# Patient Record
Sex: MALE | Employment: UNEMPLOYED | URBAN - METROPOLITAN AREA
[De-identification: names, ages, dates, MRNs, and addresses within clinical notes are randomized per-mention and may not be internally consistent; named-entity substitution may affect disease eponyms.]

---

## 2020-03-22 ENCOUNTER — HOSPITAL ENCOUNTER (INPATIENT)
Facility: CLINIC | Age: 35
LOS: 1 days | Discharge: LEFT BEFORE DISCHARGE | DRG: 885 | End: 2020-03-24
Attending: PSYCHIATRY & NEUROLOGY | Admitting: PSYCHIATRY & NEUROLOGY

## 2020-03-22 DIAGNOSIS — F22 PARANOIA (H): ICD-10-CM

## 2020-03-22 DIAGNOSIS — F29 PSYCHOSIS, UNSPECIFIED PSYCHOSIS TYPE (H): ICD-10-CM

## 2020-03-22 LAB
AMPHETAMINES UR QL SCN: NEGATIVE
BARBITURATES UR QL: NEGATIVE
BENZODIAZ UR QL: NEGATIVE
CANNABINOIDS UR QL SCN: NEGATIVE
COCAINE UR QL: NEGATIVE
ETHANOL UR QL SCN: NEGATIVE
OPIATES UR QL SCN: NEGATIVE

## 2020-03-22 PROCEDURE — 99285 EMERGENCY DEPT VISIT HI MDM: CPT | Mod: 25 | Performed by: PSYCHIATRY & NEUROLOGY

## 2020-03-22 PROCEDURE — 99285 EMERGENCY DEPT VISIT HI MDM: CPT | Mod: Z6 | Performed by: PSYCHIATRY & NEUROLOGY

## 2020-03-22 PROCEDURE — 80307 DRUG TEST PRSMV CHEM ANLYZR: CPT | Performed by: PSYCHIATRY & NEUROLOGY

## 2020-03-22 PROCEDURE — 90791 PSYCH DIAGNOSTIC EVALUATION: CPT

## 2020-03-22 PROCEDURE — 80320 DRUG SCREEN QUANTALCOHOLS: CPT | Performed by: PSYCHIATRY & NEUROLOGY

## 2020-03-22 ASSESSMENT — ENCOUNTER SYMPTOMS
ABDOMINAL PAIN: 0
FEVER: 0
HALLUCINATIONS: 1
SHORTNESS OF BREATH: 0
NERVOUS/ANXIOUS: 1
SLEEP DISTURBANCE: 1
DYSPHORIC MOOD: 0

## 2020-03-23 PROBLEM — F22 PARANOIA (H): Status: ACTIVE | Noted: 2020-03-23

## 2020-03-23 LAB
ALBUMIN SERPL-MCNC: 4.1 G/DL (ref 3.4–5)
ALP SERPL-CCNC: 55 U/L (ref 40–150)
ALT SERPL W P-5'-P-CCNC: 46 U/L (ref 0–70)
ANION GAP SERPL CALCULATED.3IONS-SCNC: 6 MMOL/L (ref 3–14)
AST SERPL W P-5'-P-CCNC: 36 U/L (ref 0–45)
BASOPHILS # BLD AUTO: 0 10E9/L (ref 0–0.2)
BASOPHILS NFR BLD AUTO: 0.2 %
BILIRUB SERPL-MCNC: 0.6 MG/DL (ref 0.2–1.3)
BUN SERPL-MCNC: 12 MG/DL (ref 7–30)
CALCIUM SERPL-MCNC: 8.9 MG/DL (ref 8.5–10.1)
CHLORIDE SERPL-SCNC: 105 MMOL/L (ref 94–109)
CHOLEST SERPL-MCNC: 214 MG/DL
CO2 SERPL-SCNC: 27 MMOL/L (ref 20–32)
CREAT SERPL-MCNC: 1.02 MG/DL (ref 0.66–1.25)
DEPRECATED CALCIDIOL+CALCIFEROL SERPL-MC: 16 UG/L (ref 20–75)
DIFFERENTIAL METHOD BLD: NORMAL
EOSINOPHIL # BLD AUTO: 0 10E9/L (ref 0–0.7)
EOSINOPHIL NFR BLD AUTO: 0.9 %
ERYTHROCYTE [DISTWIDTH] IN BLOOD BY AUTOMATED COUNT: 12.6 % (ref 10–15)
GFR SERPL CREATININE-BSD FRML MDRD: >90 ML/MIN/{1.73_M2}
GLUCOSE SERPL-MCNC: 94 MG/DL (ref 70–99)
HCT VFR BLD AUTO: 43.6 % (ref 40–53)
HDLC SERPL-MCNC: 46 MG/DL
HGB BLD-MCNC: 15.3 G/DL (ref 13.3–17.7)
IMM GRANULOCYTES # BLD: 0 10E9/L (ref 0–0.4)
IMM GRANULOCYTES NFR BLD: 0.2 %
LDLC SERPL CALC-MCNC: 148 MG/DL
LYMPHOCYTES # BLD AUTO: 2.4 10E9/L (ref 0.8–5.3)
LYMPHOCYTES NFR BLD AUTO: 51 %
MCH RBC QN AUTO: 30 PG (ref 26.5–33)
MCHC RBC AUTO-ENTMCNC: 35.1 G/DL (ref 31.5–36.5)
MCV RBC AUTO: 86 FL (ref 78–100)
MONOCYTES # BLD AUTO: 0.6 10E9/L (ref 0–1.3)
MONOCYTES NFR BLD AUTO: 12.5 %
NEUTROPHILS # BLD AUTO: 1.6 10E9/L (ref 1.6–8.3)
NEUTROPHILS NFR BLD AUTO: 35.2 %
NONHDLC SERPL-MCNC: 168 MG/DL
NRBC # BLD AUTO: 0 10*3/UL
NRBC BLD AUTO-RTO: 0 /100
PLATELET # BLD AUTO: 206 10E9/L (ref 150–450)
POTASSIUM SERPL-SCNC: 3.9 MMOL/L (ref 3.4–5.3)
PROT SERPL-MCNC: 7.9 G/DL (ref 6.8–8.8)
RBC # BLD AUTO: 5.1 10E12/L (ref 4.4–5.9)
SODIUM SERPL-SCNC: 138 MMOL/L (ref 133–144)
T4 FREE SERPL-MCNC: 1.19 NG/DL (ref 0.76–1.46)
TRIGL SERPL-MCNC: 99 MG/DL
TSH SERPL DL<=0.005 MIU/L-ACNC: 5.31 MU/L (ref 0.4–4)
WBC # BLD AUTO: 4.6 10E9/L (ref 4–11)

## 2020-03-23 PROCEDURE — 80053 COMPREHEN METABOLIC PANEL: CPT | Performed by: PSYCHIATRY & NEUROLOGY

## 2020-03-23 PROCEDURE — 85025 COMPLETE CBC W/AUTO DIFF WBC: CPT | Performed by: PSYCHIATRY & NEUROLOGY

## 2020-03-23 PROCEDURE — 84439 ASSAY OF FREE THYROXINE: CPT | Performed by: PSYCHIATRY & NEUROLOGY

## 2020-03-23 PROCEDURE — 80061 LIPID PANEL: CPT | Performed by: PSYCHIATRY & NEUROLOGY

## 2020-03-23 PROCEDURE — 36415 COLL VENOUS BLD VENIPUNCTURE: CPT | Performed by: PSYCHIATRY & NEUROLOGY

## 2020-03-23 PROCEDURE — 12400001 ZZH R&B MH UMMC

## 2020-03-23 PROCEDURE — 99223 1ST HOSP IP/OBS HIGH 75: CPT | Mod: AI | Performed by: NURSE PRACTITIONER

## 2020-03-23 PROCEDURE — 84443 ASSAY THYROID STIM HORMONE: CPT | Performed by: PSYCHIATRY & NEUROLOGY

## 2020-03-23 PROCEDURE — 82306 VITAMIN D 25 HYDROXY: CPT | Performed by: PSYCHIATRY & NEUROLOGY

## 2020-03-23 RX ORDER — BISACODYL 10 MG
10 SUPPOSITORY, RECTAL RECTAL DAILY PRN
Status: DISCONTINUED | OUTPATIENT
Start: 2020-03-23 | End: 2020-03-24 | Stop reason: HOSPADM

## 2020-03-23 RX ORDER — TRAZODONE HYDROCHLORIDE 50 MG/1
50 TABLET, FILM COATED ORAL
Status: DISCONTINUED | OUTPATIENT
Start: 2020-03-23 | End: 2020-03-24 | Stop reason: HOSPADM

## 2020-03-23 RX ORDER — OLANZAPINE 10 MG/1
10 TABLET ORAL AT BEDTIME
Status: DISCONTINUED | OUTPATIENT
Start: 2020-03-23 | End: 2020-03-24 | Stop reason: HOSPADM

## 2020-03-23 RX ORDER — ALUMINA, MAGNESIA, AND SIMETHICONE 2400; 2400; 240 MG/30ML; MG/30ML; MG/30ML
30 SUSPENSION ORAL EVERY 4 HOURS PRN
Status: DISCONTINUED | OUTPATIENT
Start: 2020-03-23 | End: 2020-03-24 | Stop reason: HOSPADM

## 2020-03-23 RX ORDER — OLANZAPINE 10 MG/1
10 TABLET ORAL
Status: DISCONTINUED | OUTPATIENT
Start: 2020-03-23 | End: 2020-03-24 | Stop reason: HOSPADM

## 2020-03-23 RX ORDER — HYDROXYZINE HYDROCHLORIDE 25 MG/1
25 TABLET, FILM COATED ORAL EVERY 4 HOURS PRN
Status: DISCONTINUED | OUTPATIENT
Start: 2020-03-23 | End: 2020-03-24 | Stop reason: HOSPADM

## 2020-03-23 RX ORDER — OLANZAPINE 10 MG/2ML
10 INJECTION, POWDER, FOR SOLUTION INTRAMUSCULAR
Status: DISCONTINUED | OUTPATIENT
Start: 2020-03-23 | End: 2020-03-24 | Stop reason: HOSPADM

## 2020-03-23 RX ORDER — ACETAMINOPHEN 325 MG/1
650 TABLET ORAL EVERY 4 HOURS PRN
Status: DISCONTINUED | OUTPATIENT
Start: 2020-03-23 | End: 2020-03-24 | Stop reason: HOSPADM

## 2020-03-23 ASSESSMENT — ACTIVITIES OF DAILY LIVING (ADL)
BATHING: 0-->INDEPENDENT
LAUNDRY: WITH SUPERVISION
HYGIENE/GROOMING: INDEPENDENT
LAUNDRY: WITH SUPERVISION
RETIRED_COMMUNICATION: 0-->UNDERSTANDS/COMMUNICATES WITHOUT DIFFICULTY
SWALLOWING: 0-->SWALLOWS FOODS/LIQUIDS WITHOUT DIFFICULTY
RETIRED_EATING: 0-->INDEPENDENT
TOILETING: 0-->INDEPENDENT
COGNITION: 1 - ATTENTION OR MEMORY DEFICITS
HYGIENE/GROOMING: INDEPENDENT
ORAL_HYGIENE: INDEPENDENT
TRANSFERRING: 0-->INDEPENDENT
DRESS: 0-->INDEPENDENT
FALL_HISTORY_WITHIN_LAST_SIX_MONTHS: NO
DRESS: SCRUBS (BEHAVIORAL HEALTH)
DRESS: SCRUBS (BEHAVIORAL HEALTH)
AMBULATION: 0-->INDEPENDENT
ORAL_HYGIENE: INDEPENDENT

## 2020-03-23 ASSESSMENT — MIFFLIN-ST. JEOR: SCORE: 1598.07

## 2020-03-23 NOTE — ED NOTES
Bed: HW01  Expected date:   Expected time:   Means of arrival:   Comments:  Zain [address] 34 y/o dizziness/ paranoia
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
## 2020-03-23 NOTE — PROGRESS NOTES
"Admission Note:    Pt is a 34 year old voluntary patient who came into the [facility] ED fearful of his delusions. Pt has one previous ED visit in November, 2019 for paranoia.Pt not currently taking any medications, per pt and stated \"I do not want medication.\" Pt cooperative and polite during search. Pt appeared guarded and suspicious during search and nursing intake interview. Wristband verified with pt with name and date of birth, orientation to unit, with no further questions from individual. Pt entered room and requested lights stay on. He laid down and placed blanket over his head.   "

## 2020-03-23 NOTE — PROGRESS NOTES
PATIENT BELONGINGS:    Items in Patient Locker:  -shoes with laces, cap, smart phone, jacket, pants with drawstrings, shirt, wallet with [place] ID    Items sent to Security:  -RBCU debit (Visa, 1055)  -RBCU debit (Visa, 4300)  -RBCU Instant Cash (2933)  -[facility] debit (Visa, 3031)  -Cash ($8.00)    ---No Cash, No credit/debit cards, and No Medications (other than noted above) at the time of the admission.    A               Admission:  I am responsible for any personal items that are not sent to the safe or pharmacy.  [facility] is not responsible for loss, theft or damage of any property in my possession.    Signature:  _________________________________ Date: _______  Time: _____                                              Staff Signature:  ____________________________ Date: ________  Time: _____      2nd Staff person, if patient is unable/unwilling to sign:    Signature: ________________________________ Date: ________  Time: _____     Discharge:  [facility] has returned all of my personal belongings:    Signature: _________________________________ Date: ________  Time: _____                                          Staff Signature:  ____________________________ Date: ________  Time: _____

## 2020-03-23 NOTE — PROGRESS NOTES
03/23/20 1435   Behavioral Health   Hallucinations denies / not responding to hallucinations   Thinking intact;poor concentration   Orientation person: oriented;place: oriented;date: oriented;time: oriented   Memory baseline memory   Insight poor   Judgement impaired   Eye Contact at examiner   Affect irritable   Mood mood is calm;irritable   Physical Appearance/Attire neat;appears stated age;attire appropriate to age and situation   Hygiene well groomed   Suicidality   (Denies`)   1. Wish to be Dead (Recent) No   2. Non-Specific Active Suicidal Thoughts (Recent) No   Duration (Lifetime) NA   Self Injury   (Denies)   Elopement   (None stated or observed)   Activity isolative;withdrawn   Speech coherent;clear   Medication Sensitivity no observed side effects;no stated side effects   Psychomotor / Gait steady;balanced   Activities of Daily Living   Hygiene/Grooming independent   Oral Hygiene independent   Dress scrubs (behavioral health)   Laundry with supervision   Room Organization independent     Pt had an isolative shift. Pt denies SI, SIB, depression, anxiety, hallucinations and side effects from medications. Pt was spent most of the shift in his room sleeping. Pt appeared to be irritable throughout the shift. Pt doesn't wish to be dead and feels safe in the unit.

## 2020-03-23 NOTE — PROGRESS NOTES
INITIAL PSYCHOSOCIAL ASSESSMENT AND NOTE  I have reviewed the chart met with the patient, and developed Care Plan.  Information for assessment was obtained from: pt and chart  PRESENTING PROBLEM: pt was admitted to station 32 on a voluntary basis secondary to psychosis. Pt believes people are a danger to him, chasing him, watching him. Pt moves between friends every few months due to his paranoia; he begins believing that they are poisoning his food.   The following areas have been assessed:  History of Mental Health and Chemical Dependency: This is pt's second inpatient mh admit. First was at [facility] from 10/31/2019 to 2019; 1 day.   Pt reports drinks about 2-3 beers per week. No drugs. No hx of CD treatment.   Living Situation: pt is homeless, usually stays with friends  Significant Life Events (Illness, Abuse, Trauma, Death): Mom  in ; dad  in   Family Description (Constellation, Family Psychiatric History): Never , one daughter with a girlfriend who still resides in [place]. Pt reports emigrated to the  about 10 years ago. Pt has 6 brothers, 2 sisters; all live in [place]. Both parents are .   No known family hx of mh or cd issues.   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 Medications will be reviewed and adjusted per MD as indicated. The treatment team will continue to assess and stabilize the  patient's mental health symptoms with the use of medications and therapeutic programming. Hospital staff will provide a safe environment and a therapeutic milieu. Staff will continue to assess patient as needed. Patient will be encouraged to participate in unit groups and activities. Patient will receive individual and group support on the unit.  CTC will do individual inpatient treatment planning and after care planning. CTC will discuss options for increasing community supports with the patient. CTC will coordinate with outpatient providers and will place referrals to ensure appropriate follow up care is in place.

## 2020-03-23 NOTE — ED NOTES
ED to Behavioral Floor Handoff    SITUATION  Ki Hdez is a 34 year old male who speaks English and lives in a home with others The patient arrived in the ED by ambulance from home with a complaint of Paranoid (outside feels stared at, people are out to get him) and Dizziness (says house makes him dizzy, feels fine outside of house)  .The patient's current symptoms started/worsened 1 day(s) ago and during this time the symptoms have increased.   In the ED, pt was diagnosed with   Final diagnoses:   Psychosis, unspecified psychosis type (H)   Paranoia (H)        Initial vitals were: BP: (!) 144/106  Pulse: 66  Temp: 97.8  F (36.6  C)  SpO2: 100 %   --------  Is the patient diabetic? No   If yes, last blood glucose? --     If yes, was this treated in the ED? --  --------  Is the patient inebriated (ETOH) No or Impaired on other substances? No  MSSA done? N/A  Last MSSA score: --    Were withdrawal symptoms treated? N/A  Does the patient have a seizure history? No. If yes, date of most recent seizure--  --------  Is the patient patient experiencing suicidal ideation? reports occasional suicidal thoughts representing feeling that life is not worth feeling    Homicidal ideation? denies current or recent homicidal ideation or behaviors.    Self-injurious behavior/urges? denies current or recent self injurious behavior or ideation.  ------  Was pt aggressive in the ED No  Was a code called No  Is the pt now cooperative? Yes  -------  Meds given in ED: Medications - No data to display   Family present during ED course? No  Family currently present? No    BACKGROUND  Does the patient have a cognitive impairment or developmental disability? No  Allergies: No Known Allergies.   Social demographics are   Social History     Socioeconomic History     Marital status: Single     Spouse name: None     Number of children: None     Years of education: None     Highest education level: None   Occupational History     None   Social  Needs     Financial resource strain: None     Food insecurity     Worry: None     Inability: None     Transportation needs     Medical: None     Non-medical: None   Tobacco Use     Smoking status: None   Substance and Sexual Activity     Alcohol use: None     Drug use: None     Sexual activity: None   Lifestyle     Physical activity     Days per week: None     Minutes per session: None     Stress: None   Relationships     Social connections     Talks on phone: None     Gets together: None     Attends [employer] service: None     Active member of club or organization: None     Attends meetings of clubs or organizations: None     Relationship status: None     Intimate partner violence     Fear of current or ex partner: None     Emotionally abused: None     Physically abused: None     Forced sexual activity: None   Other Topics Concern     None   Social History Narrative     None        ASSESSMENT  Labs results   Labs Ordered and Resulted from Time of ED Arrival Up to the Time of Departure from the ED   DRUG ABUSE SCREEN 6 CHEM DEP URINE ([facility])      Imaging Studies: No results found for this or any previous visit (from the past 24 hour(s)).   Most recent vital signs BP (!) 141/97   Pulse 66   Temp 97.8  F (36.6  C) (Oral)   SpO2 100%    Abnormal labs/tests/findings requiring intervention:---   Pain control: pt had none  Nausea control: pt had none    RECOMMENDATION  Are any infection precautions needed (MRSA, VRE, etc.)? No If yes, what infection? --  ---  Does the patient have mobility issues? independently. If yes, what device does the pt use? ---  ---  Is patient on 72 hour hold or commitment? No If on 72 hour hold, have hold and rights been given to patient? N/A  Are admitting orders written if after 10 p.m. ?N/A  Tasks needing to be completed:---     Lisa Smiley RN   ascom-- [id number]   [phone] [facility]   [phone] [facility] ED

## 2020-03-23 NOTE — PROGRESS NOTES
"Pt lying in bed with light on and head under blanket. Offered zyprexa with explanation of medication and pt refused. Pt stated \"No medications\".  "
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
## 2020-03-23 NOTE — PLAN OF CARE
BEHAVIORAL TEAM DISCUSSION    Participants: Provider: Virginia Han NP  CTC: Snow Mariscal MS,LP   Nurse: Sulema Crenshaw, BRODY   Psych Associate: Klaus Ortiz    Progress: Pt was admitted to station 32 on a voluntary basis secondary to psychosis. Pt has ongoing paranoia that is interfering with his functioning.   Anticipated length of stay: 1-2 weeks  Continued Stay Criteria/Rationale: pt to remain inpatient for safety and stabilization  Medical/Physical: none noted  Precautions:   Behavioral Orders   Procedures     Code 1 - Restrict to Unit     Routine Programming     As clinically indicated     Single Room     Status 15     Every 15 minutes.     Plan: The patient will continue to meet w/ the treatment team for assessment and treatment planning, CTC will continue to work w/ for discharge planning.    Rationale for change in precautions or plan: pt to remain inpatient for stabilization

## 2020-03-23 NOTE — PROGRESS NOTES
SPIRITUAL HEALTH SERVICES  SPIRITUAL ASSESSMENT Progress Note  [facility] ([facility]) 32N     REFERRAL SOURCE: Pt request for hospital  at admission     Called unit to assess pt's needs, due to both 'hospital ' and 'no thank you' being selected during initial intake. In consultation with staff, determined best to cancel request at this time and new order will be placed if/when appropriate.    PLAN: Spiritual health remains available.    Amna Vivar  Chaplain Resident  Pager [phone]    [facility] remains available 24/7 for emergent requests/referrals, either by having the switchboard page the on-call  or by entering an ASAP/STAT consult in Epic (this will also page the on-call ).

## 2020-03-24 VITALS
HEIGHT: 63 IN | OXYGEN SATURATION: 100 % | DIASTOLIC BLOOD PRESSURE: 101 MMHG | TEMPERATURE: 97.9 F | BODY MASS INDEX: 30.03 KG/M2 | SYSTOLIC BLOOD PRESSURE: 146 MMHG | WEIGHT: 169.5 LBS | RESPIRATION RATE: 16 BRPM | HEART RATE: 67 BPM

## 2020-03-24 PROCEDURE — 99239 HOSP IP/OBS DSCHRG MGMT >30: CPT | Performed by: NURSE PRACTITIONER

## 2020-03-24 RX ORDER — OLANZAPINE 10 MG/1
10 TABLET ORAL AT BEDTIME
Qty: 30 TABLET | Refills: 1 | Status: SHIPPED | OUTPATIENT
Start: 2020-03-24 | End: 2021-11-04

## 2020-03-24 ASSESSMENT — ACTIVITIES OF DAILY LIVING (ADL)
ORAL_HYGIENE: INDEPENDENT
LAUNDRY: WITH SUPERVISION
DRESS: SCRUBS (BEHAVIORAL HEALTH)
HYGIENE/GROOMING: INDEPENDENT

## 2020-03-24 ASSESSMENT — MIFFLIN-ST. JEOR: SCORE: 1598.98
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
## 2020-03-24 NOTE — PROGRESS NOTES
"Pt in lounge, repeatedly looking around strong and lounge. Offered emotional support and discussed with pt safety and his room. Pt appears suspicious and stated he did not like staff in his room. He stated \"I don't know what they could have been doing, they could have done anything in my room.\" Pt stated he did not like how people are walking past his room during shift change. Pt reassured staff are here for safety and room within view of nursing station. Pt insisted his door stay closed until he decides to go to his room. After several minutes, pt went to his room and laid down.  "

## 2020-03-24 NOTE — DISCHARGE INSTRUCTIONS
Behavioral Discharge Planning and Instructions      Summary:    You were admitted on 3/22/2020  due to Psychotic Symptomology.  You were treated by Virginia Han NP and discharged on 3/24/2020 from Station [facility] to your friend's home, per your report      Principal Diagnosis:     Unspecified Psychosis, likely schizophrenia      Health Care Follow-up Appointments:     You do not currently have active insurance. You have applied for insurance while here. While waiting for this to activate, you can go to the following clinics which offer free or sliding fee services:   [facility] (also has dental services)  [address] [place], [place] [address], [place] - [address]  [phone]    [facility] [place] [place] (also has dental services)  [address] [facility] [place] [facility] [address]  Phone: [phone]     [facility]  [address] [place] [address]  Hours: Monday, Wednesday, and Friday from 1-3pm; Monday through Thursday from 6:30pm-8:30pm   Phone: [phone]    No appointment is necessary; no paperwork; no fee.    [address] [place] [address] Triage Open Monday through Friday  Medical and Behavioral Health Triage is a new service at [address] [place] [address] that brings together community-based mental health agencies, [facility] , and [facility]) to address our clients' complex needs. The goal of this program is to reach people that are not already receiving services or that are not already connected to case management who have an urgent concern related to their mental health or substance use disorder.   Care coordinators, peer recovery specialists, and health professionals at [address] [place] [address] will address clients' physical health, mental health, addiction issues - and also the wrap-around services that they need to stay healthy, including housing, health insurance, and other resources.   Triage is located in [facility] at [address] [place] [address]. It is accessible by  "police from the parking lot. Everyone else can come through the front door of [address] [place] [address], and follow signs to N141.     Triage hours:10:00 am - 5:00 pm  Triage Phone Number: [phone]  Location: [address] [place] [address], room N141       If no appointments scheduled, explain pt is being discharged AMA and has no insurance  Attend all scheduled appointments with your outpatient providers. Call at least 24 hours in advance if you need to reschedule an appointment to ensure continued access to your outpatient providers.   Major Treatments, Procedures and Findings:  You were provided with: a psychiatric assessment, medication evaluation and/or management and milieu management    Symptoms to Report: increased confusion, losing more sleep, mood getting worse, thoughts of suicide or paranoid thoughts    Early warning signs can include: increased depression or anxiety sleep disturbances increased thoughts or behaviors of suicide or self-harm  increased unusual thinking, such as paranoia or hearing voices    Safety and Wellness:  Take all medicines as directed.  Make no changes unless your doctor suggests them.      Follow treatment recommendations.  Refrain from alcohol and non-prescribed drugs.  If there is a concern for safety, call 911.    Resources:   Crisis Intervention: 864.693.2746 or 985-778-0216 (TTY: 900.539.1945).  Call anytime for help.  National [place] [employer] Mental Illness (www.[place].d[place].org): 653.531.6426 or 036-640-2664.  Suicide Awareness Voices of Education (SAVE) (www.save.org): 883-511-SAVE (7243)  [facility] Crisis (COPE) Response - Adult 600 258-1330  Text 4 Life: txt \"LIFE\" to 13798 for immediate support and crisis intervention  Crisis text line: Text \"[place]\" to 931879. Free, confidential, 24/7.  Crisis Intervention: 528.222.8629 or 536-306-8569. Call anytime for help.       The treatment team has appreciated the opportunity to work with you.     If you have any questions or concerns our unit number is [phone]

## 2020-03-24 NOTE — PROGRESS NOTES
"Patient was seclusive to his room most of the evening.  He was upset when staff did environmental checks in his room and he was asked to step into the strong for a moment.  He made several attempts to return to the room and was asked to stay outside every time.  Writer intervened and reassured him that these are done twice daily on all patients to keep everyone safe and that nothing bad was going on or suspected.  Patient later refused HS zyprexa; stated \"I have to talk to my doctor before I take anything.\"  Affect has been wary and paranoid all evening.  At 2255 he burst out of his room and demanded to be taken to the ED immediately to see a doctor.  He would not answer when asked if he was in pain or where it hurt, or what he needed, changing request instead to immediate transfer to another hospital. This was all done very loudly with frequent imprecations to God.  Patient did not respond to requests that he lower his voice as others were sleeping. Patient eventually responded to reassurance and returned to bed.  "

## 2020-03-24 NOTE — PROGRESS NOTES
Pt up to counter stating he needs to go to the emergency room. Pt was asked if he could describe how he feels and pt walked away. Pt then stated he had some muscle pain in his back and offered medication. Pt refusing medication and requested vs to be taken. VS taken-see flowsheet. Pt sat in hallway, paced a few times up and down the hallway, and then returned to his room.

## 2020-03-24 NOTE — PROGRESS NOTES
Pt observed by this writer and PA pulling on locked door handle at the end of the strong. Pt appears suspicious, looking around the unit.

## 2020-03-24 NOTE — PROGRESS NOTES
"Pt appears to be responding to either AH/VH. Pt has been isolative. However, pt was visible in the milieu up until dinner. After dinner Pt stayed in his room until 11:20pm. Pt came to the desk and started pacing. Pt said \" I don't want to be here, I can't be here\"        03/23/20 2300   Behavioral Health   Hallucinations appears responding   Thinking poor concentration   Orientation person: oriented   Memory baseline memory   Insight poor   Judgement impaired   Eye Contact at floor   Affect full range affect   Mood mood is calm   Physical Appearance/Attire attire appropriate to age and situation   Hygiene well groomed   Suicidality other (see comments)  (NADIRA)   1. Wish to be Dead (Recent)   (NADIRA)   2. Non-Specific Active Suicidal Thoughts (Recent)   (NADIRA)   3. Active Sucidal Ideation with any Methods (Not Plan) Without Intent to Act (Recent)   (NADIRA)   4. Active Suicidal Ideation with Some Intent to Act, Without Specific Plan (Recent)   (NADIRA)   5. Active Suicidal Ideation with Specific Plan and Intent (Recent)   (NADIRA)   Self Injury   (None stated)   Elopement   (Yes pt said \" I don't want to be here\" )   Activity withdrawn;isolative   Speech coherent;clear   Medication Sensitivity no observed side effects;no stated side effects   Psychomotor / Gait balanced;steady   Psycho Education   Type of Intervention other (see comment)  (Observation)   Response observes from a distance   Hours 0.5   Treatment Detail Observation   Activities of Daily Living   Hygiene/Grooming independent   Oral Hygiene independent   Dress scrubs (behavioral health)   Laundry with supervision   Room Organization independent     "

## 2021-11-02 ENCOUNTER — APPOINTMENT (OUTPATIENT)
Dept: GENERAL RADIOLOGY | Facility: CLINIC | Age: 36
End: 2021-11-02
Attending: EMERGENCY MEDICINE
Payer: COMMERCIAL

## 2021-11-02 ENCOUNTER — HOSPITAL ENCOUNTER (OUTPATIENT)
Facility: CLINIC | Age: 36
Setting detail: OBSERVATION
Discharge: HOME | End: 2021-11-04
Attending: EMERGENCY MEDICINE | Admitting: EMERGENCY MEDICINE
Payer: COMMERCIAL

## 2021-11-02 DIAGNOSIS — F29 PSYCHOSIS, UNSPECIFIED PSYCHOSIS TYPE (H): ICD-10-CM

## 2021-11-02 DIAGNOSIS — R45.1 AGITATION: ICD-10-CM

## 2021-11-02 DIAGNOSIS — F22 PARANOIA (H): ICD-10-CM

## 2021-11-02 DIAGNOSIS — Z91.148 NONADHERENCE TO MEDICATION: ICD-10-CM

## 2021-11-02 DIAGNOSIS — F29 PSYCHOSIS, UNSPECIFIED PSYCHOSIS TYPE (H): Primary | ICD-10-CM

## 2021-11-02 LAB
AMPHETAMINES UR QL SCN: NORMAL
ANION GAP SERPL CALCULATED.3IONS-SCNC: 11 MMOL/L (ref 3–14)
APAP SERPL-MCNC: <2 MG/L (ref 10–30)
ATRIAL RATE - MUSE: 165 BPM
BARBITURATES UR QL: NORMAL
BASOPHILS # BLD AUTO: 0 10E3/UL (ref 0–0.2)
BASOPHILS NFR BLD AUTO: 0 %
BENZODIAZ UR QL: NORMAL
BUN SERPL-MCNC: 17 MG/DL (ref 7–30)
CALCIUM SERPL-MCNC: 8.7 MG/DL (ref 8.5–10.1)
CANNABINOIDS UR QL SCN: NORMAL
CHLORIDE BLD-SCNC: 104 MMOL/L (ref 94–109)
CO2 SERPL-SCNC: 22 MMOL/L (ref 20–32)
COCAINE UR QL: NORMAL
CREAT SERPL-MCNC: 1.14 MG/DL (ref 0.66–1.25)
DIASTOLIC BLOOD PRESSURE - MUSE: NORMAL MMHG
EOSINOPHIL # BLD AUTO: 0 10E3/UL (ref 0–0.7)
EOSINOPHIL NFR BLD AUTO: 0 %
ERYTHROCYTE [DISTWIDTH] IN BLOOD BY AUTOMATED COUNT: 13.8 % (ref 10–15)
ETHANOL SERPL-MCNC: <0.01 G/DL
GFR SERPL CREATININE-BSD FRML MDRD: 82 ML/MIN/1.73M2
GLUCOSE BLD-MCNC: 165 MG/DL (ref 70–99)
HCT VFR BLD AUTO: 43.6 % (ref 40–53)
HGB BLD-MCNC: 14.5 G/DL (ref 13.3–17.7)
HOLD SPECIMEN: NORMAL
IMM GRANULOCYTES # BLD: 0 10E3/UL
IMM GRANULOCYTES NFR BLD: 0 %
INTERPRETATION ECG - MUSE: NORMAL
LYMPHOCYTES # BLD AUTO: 5.2 10E3/UL (ref 0.8–5.3)
LYMPHOCYTES NFR BLD AUTO: 55 %
MCH RBC QN AUTO: 28.3 PG (ref 26.5–33)
MCHC RBC AUTO-ENTMCNC: 33.3 G/DL (ref 31.5–36.5)
MCV RBC AUTO: 85 FL (ref 78–100)
MONOCYTES # BLD AUTO: 0.7 10E3/UL (ref 0–1.3)
MONOCYTES NFR BLD AUTO: 7 %
NEUTROPHILS # BLD AUTO: 3.6 10E3/UL (ref 1.6–8.3)
NEUTROPHILS NFR BLD AUTO: 38 %
NRBC # BLD AUTO: 0 10E3/UL
NRBC BLD AUTO-RTO: 0 /100
OPIATES UR QL SCN: NORMAL
P AXIS - MUSE: 62 DEGREES
PCP UR QL SCN: NORMAL
PLAT MORPH BLD: ABNORMAL
PLATELET # BLD AUTO: 230 10E3/UL (ref 150–450)
POTASSIUM BLD-SCNC: 3.4 MMOL/L (ref 3.4–5.3)
PR INTERVAL - MUSE: 114 MS
QRS DURATION - MUSE: 74 MS
QT - MUSE: 238 MS
QTC - MUSE: 394 MS
R AXIS - MUSE: 13 DEGREES
RBC # BLD AUTO: 5.12 10E6/UL (ref 4.4–5.9)
RBC MORPH BLD: ABNORMAL
SALICYLATES SERPL-MCNC: <2 MG/DL
SARS-COV-2 RNA RESP QL NAA+PROBE: NEGATIVE
SMUDGE CELLS BLD QL SMEAR: PRESENT
SODIUM SERPL-SCNC: 137 MMOL/L (ref 133–144)
SYSTOLIC BLOOD PRESSURE - MUSE: NORMAL MMHG
T AXIS - MUSE: 38 DEGREES
VENTRICULAR RATE- MUSE: 165 BPM
WBC # BLD AUTO: 9.6 10E3/UL (ref 4–11)

## 2021-11-02 PROCEDURE — 90791 PSYCH DIAGNOSTIC EVALUATION: CPT

## 2021-11-02 PROCEDURE — 82077 ASSAY SPEC XCP UR&BREATH IA: CPT | Performed by: EMERGENCY MEDICINE

## 2021-11-02 PROCEDURE — 85025 COMPLETE CBC W/AUTO DIFF WBC: CPT | Performed by: EMERGENCY MEDICINE

## 2021-11-02 PROCEDURE — 250N000011 HC RX IP 250 OP 636

## 2021-11-02 PROCEDURE — 96374 THER/PROPH/DIAG INJ IV PUSH: CPT

## 2021-11-02 PROCEDURE — 73630 X-RAY EXAM OF FOOT: CPT | Mod: LT

## 2021-11-02 PROCEDURE — G0378 HOSPITAL OBSERVATION PER HR: HCPCS

## 2021-11-02 PROCEDURE — 250N000013 HC RX MED GY IP 250 OP 250 PS 637: Performed by: EMERGENCY MEDICINE

## 2021-11-02 PROCEDURE — 80179 DRUG ASSAY SALICYLATE: CPT | Performed by: EMERGENCY MEDICINE

## 2021-11-02 PROCEDURE — 80307 DRUG TEST PRSMV CHEM ANLYZR: CPT | Performed by: EMERGENCY MEDICINE

## 2021-11-02 PROCEDURE — 93005 ELECTROCARDIOGRAM TRACING: CPT

## 2021-11-02 PROCEDURE — 87635 SARS-COV-2 COVID-19 AMP PRB: CPT | Performed by: EMERGENCY MEDICINE

## 2021-11-02 PROCEDURE — 250N000013 HC RX MED GY IP 250 OP 250 PS 637: Performed by: NURSE PRACTITIONER

## 2021-11-02 PROCEDURE — C9803 HOPD COVID-19 SPEC COLLECT: HCPCS

## 2021-11-02 PROCEDURE — 96361 HYDRATE IV INFUSION ADD-ON: CPT

## 2021-11-02 PROCEDURE — 99220 PR INITIAL OBSERVATION CARE,LEVEL III: CPT | Performed by: NURSE PRACTITIONER

## 2021-11-02 PROCEDURE — 36415 COLL VENOUS BLD VENIPUNCTURE: CPT | Performed by: EMERGENCY MEDICINE

## 2021-11-02 PROCEDURE — 258N000003 HC RX IP 258 OP 636: Performed by: EMERGENCY MEDICINE

## 2021-11-02 PROCEDURE — 99285 EMERGENCY DEPT VISIT HI MDM: CPT | Mod: 25

## 2021-11-02 PROCEDURE — 80143 DRUG ASSAY ACETAMINOPHEN: CPT | Performed by: EMERGENCY MEDICINE

## 2021-11-02 PROCEDURE — 80048 BASIC METABOLIC PNL TOTAL CA: CPT | Performed by: EMERGENCY MEDICINE

## 2021-11-02 RX ORDER — PRAZOSIN HYDROCHLORIDE 1 MG/1
1 CAPSULE ORAL AT BEDTIME
Status: DISCONTINUED | OUTPATIENT
Start: 2021-11-02 | End: 2021-11-04 | Stop reason: HOSPADM

## 2021-11-02 RX ORDER — LORAZEPAM 2 MG/ML
2 INJECTION INTRAMUSCULAR ONCE
Status: COMPLETED | OUTPATIENT
Start: 2021-11-02 | End: 2021-11-02

## 2021-11-02 RX ORDER — LORAZEPAM 2 MG/ML
INJECTION INTRAMUSCULAR
Status: COMPLETED
Start: 2021-11-02 | End: 2021-11-02

## 2021-11-02 RX ORDER — OLANZAPINE 5 MG/1
5 TABLET ORAL 2 TIMES DAILY PRN
Status: DISCONTINUED | OUTPATIENT
Start: 2021-11-02 | End: 2021-11-04 | Stop reason: HOSPADM

## 2021-11-02 RX ORDER — LORAZEPAM 1 MG/1
1 TABLET ORAL EVERY 4 HOURS PRN
Status: DISCONTINUED | OUTPATIENT
Start: 2021-11-02 | End: 2021-11-04 | Stop reason: HOSPADM

## 2021-11-02 RX ORDER — OLANZAPINE 10 MG/1
10 TABLET ORAL AT BEDTIME
Status: DISCONTINUED | OUTPATIENT
Start: 2021-11-02 | End: 2021-11-04 | Stop reason: HOSPADM

## 2021-11-02 RX ORDER — OLANZAPINE 10 MG/1
10 TABLET, ORALLY DISINTEGRATING ORAL ONCE
Status: COMPLETED | OUTPATIENT
Start: 2021-11-02 | End: 2021-11-02

## 2021-11-02 RX ORDER — IBUPROFEN 600 MG/1
600 TABLET, FILM COATED ORAL EVERY 6 HOURS PRN
Status: DISCONTINUED | OUTPATIENT
Start: 2021-11-02 | End: 2021-11-04 | Stop reason: HOSPADM

## 2021-11-02 RX ADMIN — OLANZAPINE 10 MG: 10 TABLET, ORALLY DISINTEGRATING ORAL at 14:40

## 2021-11-02 RX ADMIN — OLANZAPINE 10 MG: 10 TABLET, FILM COATED ORAL at 21:56

## 2021-11-02 RX ADMIN — PRAZOSIN HYDROCHLORIDE 1 MG: 1 CAPSULE ORAL at 21:56

## 2021-11-02 RX ADMIN — SODIUM CHLORIDE 1000 ML: 9 INJECTION, SOLUTION INTRAVENOUS at 11:28

## 2021-11-02 RX ADMIN — LORAZEPAM 2 MG: 2 INJECTION INTRAMUSCULAR at 12:43

## 2021-11-02 RX ADMIN — LORAZEPAM 2 MG: 2 INJECTION INTRAMUSCULAR; INTRAVENOUS at 12:43

## 2021-11-02 RX ADMIN — IBUPROFEN 600 MG: 600 TABLET ORAL at 19:19

## 2021-11-02 RX ADMIN — SODIUM CHLORIDE 1000 ML: 9 INJECTION, SOLUTION INTRAVENOUS at 12:44

## 2021-11-02 ASSESSMENT — MIFFLIN-ST. JEOR
SCORE: 1692.5
SCORE: 1692.75

## 2021-11-02 NOTE — ED PROVIDER NOTES
History   Chief Complaint:  Altered mental status        The history is limited by the condition of the patient.   History supplemented by electronic chart review and EMS     Ki Hdez is a 35 year old male with history of paranoia, anxiety, insomnia and psychosis who presents with agitation. EMS reports that the patient called them after he began feeling anxious after not sleeping for 3 days.  She also reported to someone that he was having hallucinations.  When EMS arrived, the patient was walking out of a house to them and en route told paramedics that he was having hallucinations. He was given 2.5 g of droperidol and soon after became very agitated. The patient was then given 500 mg of Ketamine IM. He had a heart rate of 165 and blood sugar of 192. EMS also notes that he cut the bottom of his lip trying to get off of the stretcher and was placed in a spit carpio due to the blood.     Chart review shows occasional prior encounters for psychosis, most recently [facility] in late September at which time he was hospitalized for several days and ultimately discharged AMA.    Review of Systems   Unable to perform ROS: Mental status change     Allergies:  The patient has no known allergies.     Medications:  Zyprexa    Past Medical History:     Paranoia  Anxiety  Insomnia  Psychosis      Past Surgical History:    The patient denies past surgical history.     Family History:    The patient denies past family history.     Social History:  The patient presents via EMS.     Physical Exam     Patient Vitals for the past 24 hrs:   BP Temp Pulse Resp SpO2 Height Weight   11/02/21 1345 (!) 163/104 -- 111 28 97 % -- --   11/02/21 1330 (!) 160/101 -- 95 24 97 % -- --   11/02/21 1315 (!) 143/102 -- 104 23 96 % -- --   11/02/21 1300 (!) 135/117 -- 120 24 98 % -- --   11/02/21 1245 (!) 151/96 -- 112 28 96 % -- --   11/02/21 1230 -- -- 112 (!) 37 97 % -- --   11/02/21 1215 (!) 175/118 99.4  F (37.4  C) 113 (!) 40 98 % -- --  "  11/02/21 1200 (!) 176/105 -- (!) 138 (!) 42 98 % -- --   11/02/21 1145 (!) 161/107 -- (!) 134 (!) 39 96 % -- --   11/02/21 1131 (!) 174/109 -- (!) 149 24 100 % 1.676 m (5' 6\") 82 kg (180 lb 12.4 oz)     Physical Exam  General: Altered male recumbent in stabilization room 3, restrainted  HENT: mucous membranes moist, small abrasion and nonbleeding laceration to lower inner lip without significant swelling, no difficulty controlling secretions, no apparent major dental injury, no trismus  Eyes: PERRL without nystagmus  CV: extremities well perfused, regular rhythm  Resp: normal effort, speaks in full phrases, no stridor, no cough observed  GI: abdomen soft and nontender, no guarding  MSK: no bony tenderness   Skin: appropriately warm and dry  Neuro: Eyes open, intermittent thrashing in bed, moves all extremities with good tone, does not consistently follow commands   Psych: Agitated, nonverbal so unable to fully assess    Emergency Department Course   ECG  ECG obtained at 1132, ECG read at 1143  Sinus tachycardia  Nonspecific ST abnormality   Rate 165 bpm. NH interval 114 ms. QRS duration 74 ms. QT/QTc 238/394 ms. P-R-T axes 62 12 38.     Laboratory:  CBC: WBC 9.6, HGB 14.5,    BMP: Glucose 165 (H) o/w WNL (Creatinine 1.14)     Alcohol Level (Collected 1126): <0.01  Drug abuse screen 77 urine: Pending   Acetaminophen Level: <2  Salicylate Level: <2    Asymptomatic COVID19 Virus PCR by nasopharyngeal swab pending        Emergency Department Course:  Reviewed:  I reviewed nursing notes, vitals, past medical history and Care Everywhere    Assessments:  1114 The patient arrived with EMS and I received report.  1116 I obtained history and examined the patient as noted above. In person face to face assessment completed, including an evaluation of the patient's immediate reaction to the intervention, complete review of systems assessment, behavioral assessment and review/assessment of history, drugs and medications, " recent labs, etc., and behavioral condition. The patient experienced: No adverse physical outcome from seclusion/restraint initiation. The intervention of restraint or seclusion needs to continue.   1151 I rechecked the patient..   1214 I rechecked the patient. He currently has a pluse in the 110s.   1239 I rechecked the patient after the nurse noted that he was moving more.   1326 I checked on the patient again. He has a heart rate of 98 and a blood pressure in the 130s. I spoke with the charge nurse about moving the patient to another room.     Interventions:  1128 NS, 1 L, IV bolus   1243 Ativan 2 mg IV   1244 NS, 1 L, IV bolus   Zyprexa 10mg PO    Disposition:  Care of the patient was transferred to my colleague Dr. Birch pending mental health evaluation.     Impression & Plan   Medical Decision Making:  He presents with reports of hallucinations consistent with psychosis of unknown etiology.  Differential considered toxicologic conditions, psychiatric conditions, infection, and many others.  He required multiple doses of sedating medications, including a large dose of intramuscular ketamine from EMS, from which he recovered fairly promptly.  He demonstrates persistent psychosis and is placed on a hold.  I do not think that emergent neuroimaging is indicated, nor lumbar puncture.  His marked tachycardia and hypertension have improved since arrival.  He requires further assessment by the mental health service to help determine the most appropriate disposition.  Care signed out to my colleague on the afternoon shift for this purpose.    Diagnosis:    ICD-10-CM    1. Psychosis, unspecified psychosis type (H)  F29    2. Agitation  R45.1        Critical Care Time: Over 35 minutes, independent of procedures.  This included time spent obtaining a history and physical, reviewing the patient's chart, interpreting lab and imaging studies, re-examining the patient, coordinating care with other providers, and documenting  ED care provided.  He had agitated delirium and required aggressive resuscitation including chemical and physical restraints, with parenteral droperidol, ketamine, and lorazepam.  This condition carries high morbidity and mortality.  He has acute psychiatric decompensation.      Scribe Disclosure:  I, Shayne Verma, am serving as a scribe on 11/2/2021 at 11:21 AM to personally document services performed by Priyank Cazares MD based on my observations and the provider's statements to me.     This note was completed in part using Dragon voice recognition software. Although reviewed after completion, some word and grammatical errors may occur.         Priyank Cazares MD  11/02/21 1075

## 2021-11-02 NOTE — ED TRIAGE NOTES
Arrives via EMS from home.  He called 911 because he was having audio and visual hallucinations. Became combative in ambulance and was given 2.5mg droperidol followed by 500 mg ketamine IM. Arives to ED in restraints and with spithood on. Had a small cut to the lip which occurred prior to arrival.  
No

## 2021-11-02 NOTE — ED NOTES
Bed: ST03  Expected date:   Expected time:   Means of arrival:   Comments:  [facility] [place] - [facility] - 35 M eta 1110

## 2021-11-02 NOTE — ED NOTES
Shoes, pants cell phone in patient belongings bag and labeled. Shirt cut off by EMS prior to arrival

## 2021-11-02 NOTE — ED NOTES
"[facility] ED Mental Health Handoff Note:       Brief HPI:  This is a 35 year old male signed out to me by Dr. Cazares.  See initial ED Provider note for full details of the presentation.  Upon my examination the patient is complaining of left foot pain subsequently radiograph was undertaken and was unremarkable.    Safety concerns: At the time I received sign out, the patient had been aggressive/combative/agitated, but has calmed and the patient required medications for agitation and is now more calm.    Hold Status:  Active Orders   Legal    Health Officer Authority to Detain (MARII)     Frequency: Effective Now     Start Date/Time: 11/02/21 1127      Number of Occurrences: Until Specified     Order Comments: psychosis              Exam:   Patient Vitals for the past 24 hrs:   BP Temp Pulse Resp SpO2 Height Weight   11/02/21 1345 (!) 163/104 -- 111 28 97 % -- --   11/02/21 1330 (!) 160/101 -- 95 24 97 % -- --   11/02/21 1315 (!) 143/102 -- 104 23 96 % -- --   11/02/21 1300 (!) 135/117 -- 120 24 98 % -- --   11/02/21 1245 (!) 151/96 -- 112 28 96 % -- --   11/02/21 1230 -- -- 112 (!) 37 97 % -- --   11/02/21 1215 (!) 175/118 99.4  F (37.4  C) 113 (!) 40 98 % -- --   11/02/21 1200 (!) 176/105 -- (!) 138 (!) 42 98 % -- --   11/02/21 1145 (!) 161/107 -- (!) 134 (!) 39 96 % -- --   11/02/21 1131 (!) 174/109 -- (!) 149 24 100 % 1.676 m (5' 6\") 82 kg (180 lb 12.4 oz)     General: Calm redirectable  Cardiovascular: Well-perfused  Lungs: No respiratory distress  Musculoskeletal: Tenderness over the dorsum of the left foot    ED Course:    Medications   ibuprofen (ADVIL/MOTRIN) tablet 600 mg (has no administration in time range)   0.9% sodium chloride BOLUS (0 mLs Intravenous Stopped 11/2/21 1344)     Followed by   0.9% sodium chloride BOLUS (0 mLs Intravenous Stopped 11/2/21 1242)   LORazepam (ATIVAN) injection 2 mg (2 mg Intravenous Given 11/2/21 1243)   OLANZapine zydis (zyPREXA) ODT tab 10 mg (10 mg Oral Given " 11/2/21 1440)     Labs Ordered and Resulted from Time of ED Arrival to Time of ED Departure   ACETAMINOPHEN LEVEL - Abnormal       Result Value    Acetaminophen <2 (*)    BASIC METABOLIC PANEL - Abnormal    Sodium 137      Potassium 3.4      Chloride 104      Carbon Dioxide (CO2) 22      Anion Gap 11      Urea Nitrogen 17      Creatinine 1.14      Calcium 8.7      Glucose 165 (*)     GFR Estimate 82     RBC AND PLATELET MORPHOLOGY - Abnormal    Platelet Assessment        Value: Automated Count Confirmed. Platelet morphology is normal.    Smudge Cells Present (*)     RBC Morphology Confirmed RBC Indices     COVID-19 VIRUS (CORONAVIRUS) BY PCR - Normal    SARS CoV2 PCR Negative     ETHYL ALCOHOL LEVEL - Normal    Alcohol ethyl <0.01     SALICYLATE LEVEL - Normal    Salicylate <2     DRUG ABUSE SCREEN 77 URINE (FL, RH, SH) - Normal    Amphetamines Urine Screen Negative      Barbiturates Urine Screen Negative      Benzodiazepines Urine Screen Negative      Cannabinoids Urine Screen Negative      Cocaine Urine Screen Negative      Opiates Urine Screen Negative      PCP Urine Screen Negative     CBC WITH PLATELETS AND DIFFERENTIAL    WBC Count 9.6      RBC Count 5.12      Hemoglobin 14.5      Hematocrit 43.6      MCV 85      MCH 28.3      MCHC 33.3      RDW 13.8      Platelet Count 230      % Neutrophils 38      % Lymphocytes 55      % Monocytes 7      % Eosinophils 0      % Basophils 0      % Immature Granulocytes 0      NRBCs per 100 WBC 0      Absolute Neutrophils 3.6      Absolute Lymphocytes 5.2      Absolute Monocytes 0.7      Absolute Eosinophils 0.0      Absolute Basophils 0.0      Absolute Immature Granulocytes 0.0      Absolute NRBCs 0.0          XR Foot Left 3 Views   Final Result   IMPRESSION: Normal joint spaces and alignment. No fracture.                There were no significant events during my shift.    Patient was transferred to the empath unit.       Impression:    ICD-10-CM    1. Psychosis, unspecified  psychosis type (H)  F29    2. Agitation  R45.1        Plan:    Awaiting mental health evaluation/recommendations.      RESULTS:   Results for orders placed or performed during the hospital encounter of 11/02/21 (from the past 24 hour(s))   [place] Draw     Status: None    Collection Time: 11/02/21 11:26 AM    Narrative    The following orders were created for panel order [place] Draw.  Procedure                               Abnormality         Status                     ---------                               -----------         ------                     Extra Blue Top Tube[[id number]]                              Final result               Extra Red Top Tube[[id number]]                               Final result               Extra Green Top (Lithium...[[id number]]                      Final result               Extra Purple Top Tube[[id number]]                            Final result                 Please view results for these tests on the individual orders.   Extra Blue Top Tube     Status: None    Collection Time: 11/02/21 11:26 AM   Result Value Ref Range    Hold Specimen JIC    Extra Red Top Tube     Status: None    Collection Time: 11/02/21 11:26 AM   Result Value Ref Range    Hold Specimen JIC    Extra Green Top (Lithium Heparin) Tube     Status: None    Collection Time: 11/02/21 11:26 AM   Result Value Ref Range    Hold Specimen JIC    Extra Purple Top Tube     Status: None    Collection Time: 11/02/21 11:26 AM   Result Value Ref Range    Hold Specimen DONE    Acetaminophen level     Status: Abnormal    Collection Time: 11/02/21 11:26 AM   Result Value Ref Range    Acetaminophen <2 (L) 10 - 30 mg/L   Basic metabolic panel     Status: Abnormal    Collection Time: 11/02/21 11:26 AM   Result Value Ref Range    Sodium 137 133 - 144 mmol/L    Potassium 3.4 3.4 - 5.3 mmol/L    Chloride 104 94 - 109 mmol/L    Carbon Dioxide (CO2) 22 20 - 32 mmol/L    Anion Gap 11 3 - 14 mmol/L    Urea Nitrogen 17 7 - 30 mg/dL     Creatinine 1.14 0.66 - 1.25 mg/dL    Calcium 8.7 8.5 - 10.1 mg/dL    Glucose 165 (H) 70 - 99 mg/dL    GFR Estimate 82 >60 mL/min/1.73m2   CBC with platelets differential     Status: Abnormal    Collection Time: 11/02/21 11:26 AM    Narrative    The following orders were created for panel order CBC with platelets differential.  Procedure                               Abnormality         Status                     ---------                               -----------         ------                     CBC with platelets and d...[[id number]]                      Final result               RBC and Platelet Morphology[[id number]]  Abnormal            Final result                 Please view results for these tests on the individual orders.   Ethyl Alcohol Level     Status: Normal    Collection Time: 11/02/21 11:26 AM   Result Value Ref Range    Alcohol ethyl <0.01 <=0.01 g/dL   Salicylate level     Status: Normal    Collection Time: 11/02/21 11:26 AM   Result Value Ref Range    Salicylate <2 <20 mg/dL   CBC with platelets and differential     Status: None    Collection Time: 11/02/21 11:26 AM   Result Value Ref Range    WBC Count 9.6 4.0 - 11.0 10e3/uL    RBC Count 5.12 4.40 - 5.90 10e6/uL    Hemoglobin 14.5 13.3 - 17.7 g/dL    Hematocrit 43.6 40.0 - 53.0 %    MCV 85 78 - 100 fL    MCH 28.3 26.5 - 33.0 pg    MCHC 33.3 31.5 - 36.5 g/dL    RDW 13.8 10.0 - 15.0 %    Platelet Count 230 150 - 450 10e3/uL    % Neutrophils 38 %    % Lymphocytes 55 %    % Monocytes 7 %    % Eosinophils 0 %    % Basophils 0 %    % Immature Granulocytes 0 %    NRBCs per 100 WBC 0 <1 /100    Absolute Neutrophils 3.6 1.6 - 8.3 10e3/uL    Absolute Lymphocytes 5.2 0.8 - 5.3 10e3/uL    Absolute Monocytes 0.7 0.0 - 1.3 10e3/uL    Absolute Eosinophils 0.0 0.0 - 0.7 10e3/uL    Absolute Basophils 0.0 0.0 - 0.2 10e3/uL    Absolute Immature Granulocytes 0.0 <=0.0 10e3/uL    Absolute NRBCs 0.0 10e3/uL   RBC and Platelet Morphology     Status: Abnormal     Collection Time: 11/02/21 11:26 AM   Result Value Ref Range    Platelet Assessment  Automated Count Confirmed. Platelet morphology is normal.     Automated Count Confirmed. Platelet morphology is normal.    Smudge Cells Present (A) None Seen    RBC Morphology Confirmed RBC Indices    EKG 12-lead, tracing only     Status: None    Collection Time: 11/02/21 11:32 AM   Result Value Ref Range    Systolic Blood Pressure  mmHg    Diastolic Blood Pressure  mmHg    Ventricular Rate 165 BPM    Atrial Rate 165 BPM    WA Interval 114 ms    QRS Duration 74 ms     ms    QTc 394 ms    P Axis 62 degrees    R AXIS 13 degrees    T Axis 38 degrees    Interpretation ECG       Sinus tachycardia  Nonspecific ST abnormality  Abnormal ECG  No previous ECGs available  Confirmed by GENERATED REPORT, COMPUTER ([id number]),  NATHANIEL PRO ([id number]) on 11/2/2021 2:03:41 PM     Asymptomatic COVID-19 Virus (Coronavirus) by PCR Nasopharyngeal     Status: Normal    Collection Time: 11/02/21 11:38 AM    Specimen: Nasopharyngeal; Swab   Result Value Ref Range    SARS CoV2 PCR Negative Negative    Narrative    Testing was performed using the abby  SARS-CoV-2 & Influenza A/B Assay on the abby  [employer]  System.  This test should be ordered for the detection of SARS-COV-2 in individuals who meet SARS-CoV-2 clinical and/or epidemiological criteria. Test performance is unknown in asymptomatic patients.  This test is for in vitro diagnostic use under the FDA EUA for laboratories certified under CLIA to perform moderate and/or high complexity testing. This test has not been FDA cleared or approved.  A negative test does not rule out the presence of PCR inhibitors in the specimen or target RNA in concentration below the limit of detection for the assay. The possibility of a false negative should be considered if the patient's recent exposure or clinical presentation suggests COVID-19.  [facility] are certified under the Clinical  Laboratory Improvement Amendments of 1988 (CLIA-88) as qualified to perform moderate and/or high complexity laboratory testing.   Urine Drugs of Abuse Screen     Status: Normal    Collection Time: 11/02/21  2:13 PM    Narrative    The following orders were created for panel order Urine Drugs of Abuse Screen.  Procedure                               Abnormality         Status                     ---------                               -----------         ------                     Drug abuse screen 77 uri...[[id number]]  Normal              Final result                 Please view results for these tests on the individual orders.   Drug abuse screen 77 urine (FL, RH, SH)     Status: Normal    Collection Time: 11/02/21  2:13 PM   Result Value Ref Range    Amphetamines Urine Screen Negative Screen Negative    Barbiturates Urine Screen Negative Screen Negative    Benzodiazepines Urine Screen Negative Screen Negative    Cannabinoids Urine Screen Negative Screen Negative    Cocaine Urine Screen Negative Screen Negative    Opiates Urine Screen Negative Screen Negative    PCP Urine Screen Negative Screen Negative             MD Eyal Vale, Jorge L Felder MD  11/02/21 1919

## 2021-11-03 PROCEDURE — G0378 HOSPITAL OBSERVATION PER HR: HCPCS

## 2021-11-03 PROCEDURE — 99207 PR CDG-MDM COMPONENT: MEETS MODERATE - DOWN CODED: CPT | Performed by: PSYCHIATRY & NEUROLOGY

## 2021-11-03 PROCEDURE — 99225 PR SUBSEQUENT OBSERVATION CARE,LEVEL II: CPT | Performed by: PSYCHIATRY & NEUROLOGY

## 2021-11-03 PROCEDURE — 250N000013 HC RX MED GY IP 250 OP 250 PS 637: Performed by: NURSE PRACTITIONER

## 2021-11-03 RX ADMIN — OLANZAPINE 10 MG: 10 TABLET, FILM COATED ORAL at 21:03

## 2021-11-03 RX ADMIN — IBUPROFEN 600 MG: 600 TABLET ORAL at 11:08

## 2021-11-03 RX ADMIN — IBUPROFEN 600 MG: 600 TABLET ORAL at 20:01

## 2021-11-03 RX ADMIN — PRAZOSIN HYDROCHLORIDE 1 MG: 1 CAPSULE ORAL at 21:03

## 2021-11-03 ASSESSMENT — ACTIVITIES OF DAILY LIVING (ADL)
ORAL_HYGIENE: INDEPENDENT
HYGIENE/GROOMING: INDEPENDENT
DRESS: SCRUBS (BEHAVIORAL HEALTH)
DRESS: SCRUBS (BEHAVIORAL HEALTH);INDEPENDENT

## 2021-11-03 NOTE — TREATMENT PLAN
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 Pt reports he has not been sleeping for the last three days with the voices continuing to make noise and Pt finding it is difficult to sleep. Pt reports he has been staying at his house and his housemates have been trying to kick him out. Pt reports that yesterday the people and voices continued to make noise and they were doing other funny things. Pt reports feeling like people are trying to trick him and take advantage of him. Pt reports having to stay awake to \"watch his pockets\" from others.  Pt reports he has been experiencing headaches non stop, sweating, and \"slinking.\"   Pt does not endorse any substance use. Pt denies SI/HI. Pt reports taking medication, yet could not identify what medication it was and states he can \"easily forget\" to take his medication. Pt endorses hearing auditory hallucinations and experiences paranoia symptoms such as believing people are watching him, trying to trick him, and focusing their attention on him.  When prompted by Writer what assistance Pt is looking for, Pt reports he wants to \"get some sleep.\"    Anticipated number of sessions or this episode of care: 1-4    MeasurableTreatment Goal(s) related to diagnosis / functional impairment(s)    Goal 1: Goal: Patient will increase understanding of the impact of symptoms on functioning.       Objective #A Patient will Patient will identify their current reactions to their symptoms and the impact on self and others.      Intervention(s)      LMHP will guide patient in discussion to explore and process this impact. "       Objective #B      Patient will identify maladaptive coping strategies and explore other coping strategies.       Intervention(s)      LMHP will reinforce adaptive coping strategies.         Goal 2: Patient will display increased understanding of the importance of medication adherence in managing symptoms      Objective #A      Patient will identify any barriers to medication adherence and create a plan to challenge those barriers.      Intervention(s)      LMHP will facilitate guided discussion and explore options to increase medication adherence.      Objective #B      Patient will draw parallels between medication management for mental health and medication management for physical health.      Intervention(s)      LMHP will provide examples of when medication is needed for physical health and provide emphasis on how treating mental health is as important as physical health.              Appearance:   Appropriate    Eye Contact:   Poor   Psychomotor Behavior: Normal    Attitude:   Cooperative    Orientation:   All   Speech    Rate / Production: Normal     Volume:  Normal    Mood:    Anxious    Affect:    mood congruent, preoccupied with thoughts    Thought Content:  Hallucinations  Paranoia    Thought Form:  Tangential  Illogical   Insight:    Poor           PLAN:   Patient will board in emPATH until patient and treatment deem it appropriate to either discharge or admit to a higher level of care.      Alejo Gonzalez, [facility]                                                           ________

## 2021-11-03 NOTE — ED PROVIDER NOTES
EmPATH Unit - Psychiatric Consultation  [facility] Emergency Department    Ki Hdez MRN: [id number]   Age: 35 year old YOB: 1985     History     Chief Complaint   Patient presents with     Altered Mental Status     HPI  Ki Hdez is a 35 year old male with history notable for paranoia and insomnia who presented to the emergency room in a state of agitation while reporting auditory hallucinations and worsening paranoia.  He was transitioned to the empath unit yesterday at which time he met with CHEN Weeks who entered the patient to observation status and initiated Zyprexa to aid in treating psychosis.  He is being reassessed today.  Overnight, there were no acute issues and the patient has mostly isolated himself to the room and sleeping throughout the day.  On interview, he discussed various concerns stemming from society in general, interpreting that everyone is limiting his potential to achieve independence.  He has difficulty trusting people, often suspecting that they may be planning on sabotaging his attempts to gain employment or find independent housing.  He feels alone and unsupported.  He referenced occasional auditory hallucinations as voices in the background which have decreased in intensity since yesterday.  He denied suicidal and homicidal thoughts.  He did not report any side effects to his medications.  He is content staying on the unit another night.    Past Medical History  No past medical history on file.  No past surgical history on file.  OLANZapine (ZYPREXA) 10 MG tablet      No Known Allergies  Family History  No family history on file.  Social History   Social History     Tobacco Use     Smoking status: Not on file   Substance Use Topics     Alcohol use: Not on file     Drug use: Not on file      Past medical history, past surgical history, medications, allergies, family history, and social history were reviewed with the patient. No additional pertinent items.      "  Review of Systems  A complete review of systems was performed with pertinent positives and negatives noted in the HPI, and all other systems negative.    Physical Examination   BP: (!) 174/109  Pulse: (!) 149  Temp: 99.4  F (37.4  C)  Resp: 24  Height: 167.6 cm (5' 6\")  Weight: 82 kg (180 lb 12.4 oz)  SpO2: 100 %    Physical Exam  General: Appears stated age.   Neuro: Alert and fully oriented. Extremities appear to demonstrate normal strength on visual inspection.   Integumentary/Skin: no rash visualized, normal color    Psychiatric Examination   Appearance: awake, alert  Attitude:  cooperative  Eye Contact:  poor   Mood:  anxious and sad   Affect:  intensity is blunted  Speech:  clear, coherent  Psychomotor Behavior:  no evidence of tardive dyskinesia, dystonia, or tics  Thought Process:  linear  Associations:  no loose associations  Thought Content:  no evidence of suicidal ideation or homicidal ideation and possible paranoia  Insight:  partial  Judgement:  fair  Oriented to:  time, person, and place  Attention Span and Concentration:  fair  Recent and Remote Memory:  fair  Language: able to name/identify objects without impairment  Fund of Knowledge: intact with awareness of current and past events    ED Course        Labs Ordered and Resulted from Time of ED Arrival to Time of ED Departure   ACETAMINOPHEN LEVEL - Abnormal       Result Value    Acetaminophen <2 (*)    BASIC METABOLIC PANEL - Abnormal    Sodium 137      Potassium 3.4      Chloride 104      Carbon Dioxide (CO2) 22      Anion Gap 11      Urea Nitrogen 17      Creatinine 1.14      Calcium 8.7      Glucose 165 (*)     GFR Estimate 82     RBC AND PLATELET MORPHOLOGY - Abnormal    Platelet Assessment        Value: Automated Count Confirmed. Platelet morphology is normal.    Smudge Cells Present (*)     RBC Morphology Confirmed RBC Indices     COVID-19 VIRUS (CORONAVIRUS) BY PCR - Normal    SARS CoV2 PCR Negative     ETHYL ALCOHOL LEVEL - Normal    " Alcohol ethyl <0.01     SALICYLATE LEVEL - Normal    Salicylate <2     DRUG ABUSE SCREEN 77 URINE (FL, RH, SH) - Normal    Amphetamines Urine Screen Negative      Barbiturates Urine Screen Negative      Benzodiazepines Urine Screen Negative      Cannabinoids Urine Screen Negative      Cocaine Urine Screen Negative      Opiates Urine Screen Negative      PCP Urine Screen Negative     CBC WITH PLATELETS AND DIFFERENTIAL    WBC Count 9.6      RBC Count 5.12      Hemoglobin 14.5      Hematocrit 43.6      MCV 85      MCH 28.3      MCHC 33.3      RDW 13.8      Platelet Count 230      % Neutrophils 38      % Lymphocytes 55      % Monocytes 7      % Eosinophils 0      % Basophils 0      % Immature Granulocytes 0      NRBCs per 100 WBC 0      Absolute Neutrophils 3.6      Absolute Lymphocytes 5.2      Absolute Monocytes 0.7      Absolute Eosinophils 0.0      Absolute Basophils 0.0      Absolute Immature Granulocytes 0.0      Absolute NRBCs 0.0         Assessments & Plan (with Medical Decision Making)   Patient presenting with increased psychosis involving paranoia and hallucinations. Nursing notes reviewed noting no acute issues.     I have reviewed the assessment completed by the [facility].     Preliminary diagnosis:    ICD-10-CM    1. Psychosis, unspecified psychosis type (H)  F29     Rule out a primary psychotic illness   2. Agitation  R45.1    3. Nonadherence to medication  Z91.14    4. Paranoia (H)  F22         Treatment Plan:  -Continue Zyprexa 10 mg at bedtime targeting reduction of psychosis  -Continue observation status for 1 more night and plan to reassess tomorrow.  The patient is declining the option to pursue crisis placement as he prefers to resume employment after discharge.    --  Maikel Childers MD   [facility] EMERGENCY DEPT  EmPATH Unit  11/2/2021      Maikel Childers MD  11/03/21 1521

## 2021-11-03 NOTE — ED NOTES
Pt has been present on the unit but is withdrawn and isolative. Pt denies hearing voices but is  Preoccupied and exhibits poor eye contact. Pt also has movement of distraction and thought blocking. Pt is blunt and flat. Pt was able to call his work to notify he was in the hospital with assistance from staff. Pt denies any SI/HI. Pt states he is feeling better today and less anxious. Pt is waiting to see psychiatry.  Nursing to continue to monitor.

## 2021-11-03 NOTE — ED NOTES
"EmPATH Therapy Progress Note    Client Name: Ki Hdez  Date:         Service Type: [id number] - Psychotherapy (with patient) - 30 (16-37*) min      Session Start Time: 1415  Session End Time: 1445      Session Length: .50 hrs     Session #: 1        DATA      Progress Since Last Session (Related to Symptoms / Goals / Homework):   Symptoms: Improving Pt reports that he slept well.  PT reports that historically he has difficulty with sleep as he has increased racing thoughts and auditory hallucinations.  PT reports that he slept better last night.  Writer attempted to contribute that to taking hs medications.  PT reports that although medications can be helpful, he reports that he can often forget when he is feeling stressed out of which he has been.  PT states that he is currently homeless and staying at a friends house.  PT denies the use of shelters but as recently used crisis homes.  PT states that he feels like a group of people are after him to make sure that he is always at his worst and not succeeding.  PT states that he feels if he gets his own apartment all of his other \"problems\" including his mental health will be solved.  PT does acknowledge that taking better care of himself would also be helpful.  PT does appear overall improved from Pt's initial consult note.  PT makes better contact and is david to participate in the assessment.  PT denies any current drug or alcohol abuse.  PT states that his main desire is to work/find a job and help support his family.  Pt denies any current suicidal ideation, intent, or planning.  PT denies any self harm or homicidal ideation.        Episode of Care Goal Progress: 1     Current / Ongoing Stressors and Concerns:   Homelessness, limited support, poor med compliance     Treatment Objective(s) Addressed in This Session:   Increase Pt's connection between his psychosis/paranoia impact his community functioning     Intervention:   Motivational Interviewing: " .        ASSESSMENT: Current Emotional / Mental Status (status of significant symptoms):   Risk status (Self / Other harm or suicidal ideation)  Client denies a history of suicidal ideation, suicide attempts, self-injurious behavior, homicidal ideation, homicidal behavior and and other safety concerns   Client denies current fears or concerns for personal safety.   Client denies current or recent suicidal ideation or behaviors.   Client denies current or recent homicidal ideation or behaviors.   Client denies current or recent self injurious behavior or ideation.   Client denies other safety concerns.   Recommended that patient call 911 or go to the local ED should there be a change in any of these risk factors.     Appearance:   Appropriate    Eye Contact:   Fair    Psychomotor Behavior: Normal    Attitude:   Cooperative    Orientation:   All   Speech    Rate / Production: Normal/ Responsive    Volume:  Normal    Mood:    Anxious  Normal   Affect:    paranoid    Thought Content:  Paranoia    Thought Form:  Circumstantial Psychosis   Insight:    Fair      Medication Review:   Changes to psychiatric medications, see updated Medication List in EPIC.      Medication Compliance:   Yes      Changes in Health Issues:   None reported     Chemical Use Review:   Substance Use: Chemical use reviewed, no active concerns identified      Tobacco Use: No current tobacco use.       Collateral Reports Completed:   Not Applicable    PLAN: (Client Tasks / Therapist Tasks / Other)  Pt will con't to stay on observation status another night and then hopes to discharge back to his friends house 11/4.  PT would benefit from review of his current providers upon discharge for aftercare planning.        WILMAR Hurst November 3, 2021

## 2021-11-03 NOTE — CONSULTS
"11/2/2021  Ki Hdez 1985     [facility] Mental Health Assessment:    Started at: 1915  Completed at: 1945  What type of assessment are you doing today? Crisis assessment    1.  Presenting Problem:      Referral Method to ED? Medics    What brings the patient to the ED today?   *information gathered during assessment is limited due to Pt's presentation and disoriented statements made throughout the assessment*  Pt is a 35 year old male with a history of paranoia, anxiety, insomnia and psychosis who presents to the ED for mental health concerns and agitation. Pt reports he has not been sleeping for the last three days with the voices continuing to make noise and Pt finding it is difficult to sleep. Pt reports he has been staying at his house and his housemates have been trying to kick him out. Pt reports that yesterday the people and voices continued to make noise and they were doing other funny things. Pt reports feeling like people are trying to trick him and take advantage of him. Pt reports having to stay awake to \"watch his pockets\" from others.  Pt reports he has been experiencing headaches non stop, sweating, and \"slinking.\"   Pt does not endorse any substance use. Pt denies SI/HI. Pt reports taking medication, yet could not identify what medication it was and states he can \"easily forget\" to take his medication. Pt endorses hearing auditory hallucinations and experiences paranoia symptoms such as believing people are watching him, trying to trick him, and focusing their attention on him.  When prompted by Writer what assistance Pt is looking for, Pt reports he wants to \"get some sleep.\"  Writer attempted to collect collateral from emergency contact, Klaus ([phone]) yet was unable to connect. Writer left voicemail with instructions to call back to provide information.      Has this happened before? Yes Pt states this happened before a couple of months ago.     Duration of presenting problem: Pt " "reports not being able to sleep since Saturday, 10/30/2021.    Additional Stressors: Pt reports he just moved back from [place] and stated things were not working out there. Pt reports since moving back, he has been spending time with people who he believes are pretending to help him but are taking his things, one by one.     2.  Risk Assessment:  Suicide and Self-Harm    ESS-6  1.a. Over the past 2 weeks, have you had thoughts of killing yourself? No   1.b. Have you ever attempted to kill yourself and, if yes, when did this last happen? No  2. Recent or current suicide plan? No  3. Recent or current intent to act on ideation? No  4. Lifetime psychiatric hospitalization? Yes  5. Pattern of excessive substance use? No  6. Current irritability, agitation, or aggression? No  ESS-6 Score: 1    SI: N/A  Plan: No  Intent: No   Prior Attempts: No     Protective Factors: Pt lives with others, does not endorse any SI/HI, and allegedly has established outpatient services.    Hopes and goals for the future: Pt reports wanting to be stable, not relying on others, and be by himself.    Coping Skills: What helps and doesn't help? \"Look at things on my phone.\"    Additional Risk Factors Related to Safety and Suicide: Yes: Lack of support and Psychosis    Is the patient engaged in self injurious behaviors? No     Risk to Others    Aggressive/Assaultive/Homicidal Risk Factors: No     Duty to Warn? No     Was a Child Protection Report Made? No       Was a Adult Protection Report Made? No        Sexually inappropriate behavior? No        Vulnerability to sexual exploitation? No     Additional information: NA      3. Mental Health Symptoms and Substance Use  Current Symptoms and Mental Health History    GAIN Short Screener (GAIN-SS) administered? NA    Attention, Hyperactivity, and Impulsivity Symptoms      Patient reported symptoms related to hyperactivity, inattention, or impulsivity? No    Anxiety Symptoms    Patient reported " "anxiety symptoms? Yes: Generalized Symptoms: Other: Pt reports anxiety when \"people don't want to see me catching up with the system. They are watching my pocket, the people around me, people that I used to know. People have been out to get me.\"       Behavioral Difficulties    Patient reported behavioral difficulties? No       Mood Symptoms    Patient reported mood disorder symptoms? Yes: Sleep disturbance        Eating Disorders and Appetite Disturbance      Patient reported appetite symptoms? Yes: Loss of Appetite  Pt reports it has not been good at all and it started a week ago.       SCOFF  Do you make yourself sick (induce vomiting) because you feel uncomfortably full? No   Do you worry that you have lost Control over how much you eat? No  Have you recently lost more than 14 lb in a three-month period? No   Do you think you are too fat, even though others say you are too thin? No   Would you say that food dominates your life? No  SCOFF Score: 0    Patient reported appetite or eating disorder symptoms? No      Interpersonal Functioning     Patient reported difficulties that may be associated with personality and interpersonal functioning? No      Learning Disabilities/Cognitive/Developmental Disorders    Patient reported concerns related to learning disabilities, cognitive challenges, and/or developmental disorders? No       General Cognitive Impairments    Patient reported symptoms of cognitive impairments? No      Sleep Disturbance    Patient reported difficulties with sleep? Yes: Difficulty falling asleep  and Difficulty staying sleep    Pt reports he has been unable to sleep for the past 3 days.    Psychosis Symptoms    Patient reported symptoms of psychosis? Yes: Hallucinations: Auditory and Paranoia  Pt reports hearing voices in his head and they are making noises which makes it difficult for Pt to sleep. Pt reports believing people he used to know and lives with have been trying to trick him and take " "advantage of him.    Trauma and Post-Traumatic Stress Disorder    Physical Abuse: No no  Emotional/Psychological Abuse: No  Sexual Abuse: No   Loss of a friend or family member to suicide: No  Other Traumatic Event: No     Patient reported trauma related symptoms? No       Impact of Mental Health on Functioning      Negative Impact Score: 5/10  Subjective Impact on functioning: \"I try to make myself comfortable, keep to myself\"  How do symptoms vary from baseline? Pt is being untrusting of other people, hearing voices, and not being able to sleep.    Current and Historical Substance Use Note:    IIs there a history of, or current, substance use? No    Have you been to chemical dependency treatment or detox before? No     CAGE-AID    Have you felt you ought to cut down on your drinking or drug use? No     Have people annoyed you by criticizing your drinking or drug use? No   Have you felt bad or guilty about your drinking or drug use? No  Have you ever had a drink or used drugs first thing in the morning to steady your nerves or to get rid of a hangover? No   CAGE-AID Score: 0/4    Drug screen completed? Yes tested negative for all substances.   BAL/Breathalyzer completed? No       Mental Status Exam:    Affect: Other: mood congruent. preoccupied with thoughts.  Appearance: Appropriate   Attention Span/Concentration: Attentive    Eye Contact: Avoidant  Fund of Knowledge: Appropriate   Language /Speech Content: Fluent  Language /Speech Volume: Normal   Language /Speech Rate/Productions: Normal   Recent Memory: Intact  Remote Memory: Intact  Mood: Anxious   Orientation:   Person: Yes   Place: Yes  Time of Day: Yes   Date: Yes   Situation (Do they understand why they are here?): Yes   Psychomotor Behavior: Normal   Thought Content: Hallucinations and Paranoia  Thought Form: Loose Associations    4. Social and Environmental Conditions   Is the patient their own guardian? Yes    Living Situation: With others: Pt reports " "living in a house with \"Peter.\"    Support system and quality of connections: \"I don't have anybody. The only person I have right now is Adrian, my brother, Barak.\" Upon further prompting during the assessment, Adrian is his cousin.     Income source: Employment: Pt reports starting a new job yesterday at \"[employer]\" in [place], [place].    Issues with employment or education: No    Legal Concerns  Do you have any history of or current involvement with the legal system? No    Spiritual and Cultural Influences  Do you have any Quaker beliefs that are important in your life? Yes Mandaeism    Do you have any cultural influences in your life that impact your mental health care? No        5. Psychiatric History, Medical History, and Current Care      Patient Mental Health Services   Does the patient have a history of mental health concerns/diagnoses? Yes Pt states he goes to the hospital regularly.       Current Providers  Primary Care Provider: Yes Pt identifies \"Ranjit\" at [facility].   Psychiatrist: Yes Pt is unable to identify when prompted by Writer.  Therapist: Yes Pt identifies his therapist as \"Grecia.\"  : No   [facility]: No   ACT Team: No   Other: No    History of Commitment? No  History of Psychiatric Hospitalizations? Yes Per chart review, Pt frequents hospitals due to similar presentation.   History of programmatic care? No    Family Mental Health History   Family History of Mental Health or Chemical Dependency Issues? No     Development and Physical Health Challenges  Delays or concerns meeting developmental milestones? No  Current psychotropic medications? Yes Pt states he is unable to identify what medications he takes.  Medication Compliant? No: Pt reports it is \"easy to forget to take the medication at the right time.\"   Recent medication changes? NA    History of concussion or TBI? No     Additional Information: NA    6. Collateral Information and Collaboration    Collaboration with medical " "staff:Referral Information:   Medical Records, Psychiatry and Nursing     Collateral Information/Sources: Family: Writer attempted to contact Klaus, a relative ([phone]) yet was unable to connect with him. Writer left voicemail with instructions to call back to provide information.    7. Assessment and Diagnosis  Assessment of patient strengths and vulnerabilities    Strengths, Protective Factors, & Community Resources: Pt lives with others, does not endorse any SI/HI, and allegedly has established outpatient services.    Patient skills, abilities, and coping skills (what is going well?): Pt reports wanting to be stable, not relying on others, and be by himself. \"Look at things on my phone.\"    Patient vulnerabilities: Pt presents as actively experiencing auditory hallucinations and paranoid thinking.    Diagnosis  F29 Unspecified schizophrenia spectrum and other psychotic disorder    8.Therapeutic Methodologies Utilized in Assessment    Psychotherapy techniques and/or interventions used: Establishing rapport, Active listening, Assess dimensions of crisis, Apply solution-focused therapy to address current crisis and Identify additional supports and alternative coping skills    9. Patient Care/Treatment Plan  Summary of Patient Presentation and needs  What are the basic needs for this patient in this moment? Observation to monitor hallucinations and paranoia. Get sleep.      Consultations :  Attending provider consulted? Yes  Attending Name: POLO Kimble CNP  Attending concurs with disposition? Yes     Recommended disposition: Determination in process, [facility] team will update as disposition is finalized.  See ED notes for further information.     Does the patient agree with the recommended level of care? Yes    Final disposition: Determination in process, [facility] team will update as disposition is finalized.  See ED notes for further information.     Disposition Details: Consult is still in process at the time of " writing this note.  Information from this assessment will be communicated to the attending provider.     If Inpatient, is patient admitted voluntary? N/A   Patient aware of potential for transfer if there is not appropriate placement? NA  [facility]? NA   Central Intake Notified? NA    10. Patient Care Document: Safety and After Care Planning:          Safety Plan Provided? No    Follow-Up Plans and Providers: NA    Follow-Up Plan:  After care plan provided to the patient/guardian by: NA  After care plan provided to any additional sources/parties? No    Duration of face to face time with patient in minutes: .50 hrs    CPT code(s) utilized: 32473 - Psychotherapy for Crisis - 60 (30-74*) min      WILMAR Aguero

## 2021-11-03 NOTE — ED PROVIDER NOTES
"[facility] Unit - Initial Psychiatric Observation Note  [facility] Emergency Department  Observation Initiation Date: Nov 2, 2021    Ki Hdez MRN: [id number]   Age: 35 year old YOB: 1985     History     Chief Complaint   Patient presents with     Altered Mental Status     HPI  Ki Hdez is a 35 year old male with a past history notable for paranoia, anxiety, insomnia and psychosis who presents to the ED via EMS with agitation.  Patient called 911 himself to report feeling anxious.  He is paranoid people are out to get him.  Patient reported hallucinations en route and was medicated with Droperidol 2.5 mg, which caused him to become agitated thus he was given Ketamine 500 mg IM.  He arrived to the ED in restraints and in a spit carpio due to bleeding from his bottom lip which he injured.  He received doses of ativan and Zyprexa in the ED ultimately regaining behavioral control. He was medically cleared and transferred to [facility] for psychiatric assessment. On examine, patient talks about starting a new job yesterday and feeling like everyone was talking about him.  He reports poor sleep over the past 3 days and difficulty trusting people, including his roommates. He feels they are putting alcoholic drinks next to him to get him intoxicated.  He mentions having to \"watch his pockets.\"  He endorses hearing voices.  They tell him he is being monitored by others and sending updates to people back in [place].  He feels safe on the unit. He ate food without issue. He has been calm and cooperative.    Past Medical History  No past medical history on file.  No past surgical history on file.  OLANZapine (ZYPREXA) 10 MG tablet      No Known Allergies  Family History  No family history on file.  Social History   Social History     Tobacco Use     Smoking status: Not on file   Substance Use Topics     Alcohol use: Not on file     Drug use: Not on file      Past medical history, past " "surgical history, medications, allergies, family history, and social history were reviewed with the patient. No additional pertinent items.       Review of Systems  A complete review of systems was performed with pertinent positives and negatives noted in the HPI, and all other systems negative.    Physical Examination   BP: (!) 174/109  Pulse: (!) 149  Temp: 99.4  F (37.4  C)  Resp: 24  Height: 167.6 cm (5' 6\")  Weight: 82 kg (180 lb 12.4 oz)  SpO2: 100 %    Physical Exam  General: Appears stated age.   Neuro: Alert and fully oriented. Extremities appear to demonstrate normal strength on visual inspection.   Integumentary/Skin: no rash visualized, normal color    Psychiatric Examination   Appearance: awake, alert and mild distress  Attitude:  cooperative  Eye Contact:  good  Mood:  anxious  Affect:  mood congruent and intensity is heightened  Speech:  rambling and thick accent  Psychomotor Behavior:  no evidence of tardive dyskinesia, dystonia, or tics  Thought Process:  disorganized  Associations:  no loose associations  Thought Content:  no evidence of suicidal ideation or homicidal ideation, auditory hallucinations present and no visual hallucinations present  Insight:  limited  Judgement:  limited  Oriented to:  time, person, and place  Attention Span and Concentration:  fair  Recent and Remote Memory:  intact  Language: able to name/identify objects without impairment  Fund of Knowledge: intact with awareness of current and past events    ED Course        Labs Ordered and Resulted from Time of ED Arrival to Time of ED Departure   ACETAMINOPHEN LEVEL - Abnormal       Result Value    Acetaminophen <2 (*)    BASIC METABOLIC PANEL - Abnormal    Sodium 137      Potassium 3.4      Chloride 104      Carbon Dioxide (CO2) 22      Anion Gap 11      Urea Nitrogen 17      Creatinine 1.14      Calcium 8.7      Glucose 165 (*)     GFR Estimate 82     RBC AND PLATELET MORPHOLOGY - Abnormal    Platelet Assessment        " Value: Automated Count Confirmed. Platelet morphology is normal.    Smudge Cells Present (*)     RBC Morphology Confirmed RBC Indices     COVID-19 VIRUS (CORONAVIRUS) BY PCR - Normal    SARS CoV2 PCR Negative     ETHYL ALCOHOL LEVEL - Normal    Alcohol ethyl <0.01     SALICYLATE LEVEL - Normal    Salicylate <2     DRUG ABUSE SCREEN 77 URINE (FL, RH, SH) - Normal    Amphetamines Urine Screen Negative      Barbiturates Urine Screen Negative      Benzodiazepines Urine Screen Negative      Cannabinoids Urine Screen Negative      Cocaine Urine Screen Negative      Opiates Urine Screen Negative      PCP Urine Screen Negative     CBC WITH PLATELETS AND DIFFERENTIAL    WBC Count 9.6      RBC Count 5.12      Hemoglobin 14.5      Hematocrit 43.6      MCV 85      MCH 28.3      MCHC 33.3      RDW 13.8      Platelet Count 230      % Neutrophils 38      % Lymphocytes 55      % Monocytes 7      % Eosinophils 0      % Basophils 0      % Immature Granulocytes 0      NRBCs per 100 WBC 0      Absolute Neutrophils 3.6      Absolute Lymphocytes 5.2      Absolute Monocytes 0.7      Absolute Eosinophils 0.0      Absolute Basophils 0.0      Absolute Immature Granulocytes 0.0      Absolute NRBCs 0.0         Assessments & Plan (with Medical Decision Making)   Patient presenting with paranoia and auditory hallucinations . Nursing notes reviewed noting no acute issues.     I have reviewed the assessment completed by the [facility].     During the observation period, the patient did not require medications for agitation, and did not require restraints/seclusion for patient and/or provider safety.     The patient was found to have a psychiatric condition that would benefit from an observation stay in the emergency department for further psychiatric stabilization and/or coordination of a safe disposition. The observation plan includes serial assessments of psychiatric condition, potential administration of medications if indicated, further disposition  pending the patient's psychiatric course during the monitoring period.     Patient has a well documented history of presenting with similar symptoms since 2019.  He has been hospitalized in March 22-24, 2020 and felt to have likely paranoid schizophrenia. He has many ED visits for symptoms of anxiety, paranoia and psychosis with the most recent one being 9/28/21.  It appears once he starts back on Zyprexa 10 mg at bedtime, he clears rather quickly and has been discharged back home or to crisis beds.  He admits to sporadic medication compliance.  He does not follow up regularly with an outpatient provider. Chart review shows he has been prescribed minipress 1 mg at bedtime, remeron and trazodone in the past.  PDMP review shows he was prescribed 7 tablets of Ativan in July 2021.  He is open to getting back on medications with a goal of quieting the voices and obtaining better sleep.    Preliminary diagnosis:    ICD-10-CM    1. Psychosis, unspecified psychosis type (H)  F29     likely schizophrenia   2. Agitation  R45.1    3. Nonadherence to medication  Z91.14    4. Paranoia (H)  F22         Treatment Plan:  -Observation status for safety monitoring and symptom control with psychotropics.  -Restart Zyprexa 10 mg at bedtime as he previously has adequate symptom control after restarting.    -Zyprexa 5 mg PO twice a day as needed for agitation due to his history of aggression.  -Ativan 1 mg PO every 4 hours as needed for agitation and aggression.  -Patient reports nightmares and would like to restart Prozosin 1 mg at bedtime.  -Reassess tomorrow.  -Would benefit from regular outpatient monitoring and possibly switching to a BOOTH due to history of medication inconsistency with frequent exacerbations in psychotic symptoms resulting in repeated ED visits.    --  POLO Colvin CNP   [facility] EMERGENCY DEPT  EmPATH Unit  11/2/2021        Desi Horowitz APRN CNP  11/02/21 [id number]

## 2021-11-03 NOTE — PLAN OF CARE
"35 year old male received from ED due to psychosis. Per report, patient called 911 himself due to auditory hallucinations and insomnia. Endorses auditory hallucinations currently. Appears preoccupied. Denies SI/HI. Expressed that he has felt \"down\" lately and has had trouble sleeping because the voices keep him awake at night. Patient is willing to take medications to help with the voices and sleep and is agreeable to stay overnight. Nursing and risk assessments completed. Assessments reviewed with LMHP and physician. Video monitoring in progress, patient informed.  Admission information reviewed with patient. Patient given a tour of [facility] and instructions on using the facility. Questions regarding [facility] addressed. Pt search completed and belongings inventoried.    "

## 2021-11-04 VITALS
HEART RATE: 73 BPM | TEMPERATURE: 99.1 F | SYSTOLIC BLOOD PRESSURE: 138 MMHG | RESPIRATION RATE: 16 BRPM | DIASTOLIC BLOOD PRESSURE: 105 MMHG | BODY MASS INDEX: 29.05 KG/M2 | WEIGHT: 180.78 LBS | HEIGHT: 66 IN | OXYGEN SATURATION: 99 %

## 2021-11-04 PROCEDURE — G0378 HOSPITAL OBSERVATION PER HR: HCPCS

## 2021-11-04 PROCEDURE — 99217 PR OBSERVATION CARE DISCHARGE: CPT | Performed by: PSYCHIATRY & NEUROLOGY

## 2021-11-04 RX ORDER — PRAZOSIN HYDROCHLORIDE 1 MG/1
1 CAPSULE ORAL AT BEDTIME
Qty: 30 CAPSULE | Refills: 0 | Status: SHIPPED | OUTPATIENT
Start: 2021-11-04

## 2021-11-04 RX ORDER — OLANZAPINE 10 MG/1
10 TABLET ORAL AT BEDTIME
Qty: 30 TABLET | Refills: 0 | Status: SHIPPED | OUTPATIENT
Start: 2021-11-04

## 2021-11-04 ASSESSMENT — ACTIVITIES OF DAILY LIVING (ADL)
DRESS: SCRUBS (BEHAVIORAL HEALTH);INDEPENDENT
HYGIENE/GROOMING: INDEPENDENT
ORAL_HYGIENE: INDEPENDENT

## 2021-11-04 NOTE — ED NOTES
Patient presented with a flat, blunted affect and depressed in mood. Patient continues to be withdrawn and isolative. Denied SI, HI, and hallucinations. Patient did report generalized body aches with a pain of 5/10. PRN pain medication was offered and given. Continues to be cooperative and was med compliant with bedtime medications. He is now sleeping in sensory room B. Plan is for patient to be on observation overnight. Patient is aware and agreeable this plan. Will continue to monitor.

## 2021-11-04 NOTE — DISCHARGE INSTRUCTIONS
You have appointments via your phone on 11/8 at 1:30 with Kitty for therapy .  There phone number is [phone] if you need them    You have a psychiatry appointment (to refill your meds) with Jonathan on 11/10 at 4PM.  There number is [phone]    If I am feeling unsafe or I am in a crisis, I will:   Contact my established care providers   Call the National Suicide Prevention Lifeline: 671.149.7066   Go to the nearest emergency room   Call 911     Warning signs that I or other people might notice when a crisis is developing for me: Increased thoughts of people watching me, paranoia, not sleeping    Things I am able to do on my own to cope or help me feel better: taking care of myself, taking medications, sleeping, eating and drinking water     Things that I am able to do with others to cope or help me better: Take my medications     Things I can use or do for distraction: Working     Changes I can make to support my mental health and wellness: Take my medications     People in my life that I can ask for help: crisis line     Your [facility] has a mental health crisis team you can call 24/7: [facility] Adult, [phone]     Other things that are important when I m in crisis: I can return to the hospital if I need help     Additional resources and information: below    Crisis Lines  Crisis Text Line  Text 563120  You will be connected with a trained live crisis counselor to provide support.    Gambling Hotline  1.586.333.hope [4673]    National Hope Line  1.800.SUICIDE [3423525]    National Suicide Prevention Lifeline  Free and confidential support  1.102.083.TALK [3239]  http://suicidepreventionlifeline.org    The Hitesh Project (LGBTQ Youth Crisis Line)  9.532.857.7492  text START to 593-290    's Crisis Line  3.367.847.2654 (Press 1)  or text 866172    [facility] Mental Health Crisis Response  Within [place], call **CRISIS [**773410] to be connected to a mental health professional who can  "assist you.      [facility] Crisis  [phone]    [facility] Mobile Crisis  [phone]    [facility] Crisis  [phone]    [facility] Mobile Crisis  [phone] (adults)  [phone] (children)    [facility] Crisis  [phone] (adults)  [phone] (children)    [facility] Mobile Crisis  [phone]    [facility] Mobile Crisis  [phone]    Community Resources  Fast Tracker  Linking people to mental health and substance use disorder resources  [employer]ThingWorx     [place] Mental Health Warm Line  Peer to peer support  Monday thru Saturday, 12 pm to 10 pm  196.610.5128 or 6.887.329.4115  Text \"Support\" to 70699    National [place] on Mental Illness (OFELIA)  381.562.1958 or 1.888.OFELIA.HELPS    [facility] Urgent Care for Adult Mental Health  [facility] residents only   [address]  [phone]    Walk[facility]in Counseling Center  Free mental health counseling  [address]  [phone]    Mental Health Apps  My3  https://myRelcypp.org/    VirtualHopeBox  https://Meet You.org/apps/virtual-hope-box/    Suicide Safety Plan (CIHI)    Calm Harm  "

## 2021-11-04 NOTE — ED NOTES
[facility] Reassessment and Progress Note    Client Name: Ki Hdez  Date: November 4, 2021     Presenting issue that brought patient to the emPATH unit: PT was initially brought to the ED for disorganization, psychosis, and insomnia.    Current presentation on the unit: Pt has been on Em[facility] for the last 2 days.  PT has been observed as fairly withdrawn but approachable by staff for interviews.  PT has been able to sleep in the sensory rooms as needed each night and today Pt was in the milieu watching TV.  PT states that he would like to discharge today.  Pt admits that historically he often forgets to take his medications and then per records appears to return to the hospitals for concerns of psychosis and disorganization.  PT has been able to be medication compliant while on Em[facility] which appears to have stabilized his insomnia and psychosis.  Pt denies any current auditory or visual hallucinations.  Pt con't to present with paranoia sx, however it appears improved and per charting he likely appears to have baseline paranoia.  PT denies any further needs from the hospital.  PT presents as alert and oriented, calm and cooperative.      Current risk to self or others? No.  Pt denies any current suicidal ideation, intent, or planning.  PT denies any self harm or homicidal.    Summary of therapeutic interventions completed with patient: Discharge planning and safety planning.    Treatment objectives addressed in this session: addressed PT's psychosis improvement    Progress on treatment goals: Completed     Additional collateral information: None     Mental Status:     Appearance:   Appropriate    Eye Contact:   Good    Psychomotor Behavior: Normal    Attitude:   Cooperative    Orientation:   All   Speech    Rate / Production: Normal/ Responsive    Volume:  Normal    Mood:    Normal   Affect:    Blunted    Thought Content:  Clear    Thought Form:  Coherent  Goal Directed    Insight:    Fair       Plan:  Discharge    Disposition: Individual therapy  and Medication management    Rationale for disposition: Writer at the time of assessment recommends discharge.  PT denies any suicidal ideation, intent, or planning.  PT denies any self harm or homicidal ideation.  Pt doesn't appear to be in need of acute stabilization for psychosis, emil, paranoia, or delusional thinking.  Pt was agreeable to discharging to his friends house.  PT was agreeable to post discharge appts with psychiatry and therapy via .  PT appears future and goal oriented.  PT was engaged in safety planning and completed such.    Reviewed assessment with attending provider: Andmargo     Total time spent with patient:.25 hrs     CPT code: 43142 - Psychotherapy (with patient) - 30 (16-37*) min      WILMAR Hurst     If I am feeling unsafe or I am in a crisis, I will:   Contact my established care providers   Call the National Suicide Prevention Lifeline: 709.403.4165   Go to the nearest emergency room   Call 911     Warning signs that I or other people might notice when a crisis is developing for me: Increased thoughts of people watching me, paranoia, not sleeping    Things I am able to do on my own to cope or help me feel better: taking care of myself, taking medications, sleeping, eating and drinking water     Things that I am able to do with others to cope or help me better: Take my medications     Things I can use or do for distraction: Working     Changes I can make to support my mental health and wellness: Take my medications     People in my life that I can ask for help: crisis line     Your [facility] has a mental health crisis team you can call 24/7: [facility] Adult, [phone]     Other things that are important when I m in crisis: I can return to the hospital if I need help     Additional resources and information: below    Crisis Lines  Crisis Text Line  Text 045752  You will be connected with a trained live crisis counselor to  "provide support.    Gambling Hotline  1.800.333.hope [4673]    National Hope Line  1.800.SUICIDE [4738336]    National Suicide Prevention Lifeline  Free and confidential support  1.800.740.TALK [3552]  http://suicidepreventionlifeline.org    The Hitesh Project (LGBTQ Youth Crisis Line)  4.687.169.4069  text START to 384-488    [facility] Crisis Line  6.431.586.9584 (Press 1)  or text 098753    [facility] Mental Health Crisis Response  Within [place], call **CRISIS [**277722] to be connected to a mental health professional who can assist you.      [facility] Crisis  [phone]    [facility] Mobile Crisis  [phone]    [facility] Crisis  [phone]    [facility] Mobile Crisis  [phone] (adults)  [phone] (children)    [facility] Mobile Crisis  [phone] (adults)  [phone] (children)    [facility] Mobile Crisis  [phone]    [facility] Mobile Crisis  [phone]    Community Resources  Fast Tracker  Linking people to mental health and substance use disorder resources  fasttrackSmash Technologiesn.org     [place] Mental Health Warm Line  Peer to peer support  Monday thru Saturday, 12 pm to 10 pm  707.618.6402 or 7.053.764.2068  Text \"Support\" to 84290    National [place] on Mental Illness (OFELIA)  738.108.8106 or 1.888.OFELIA.HELPS    [facility] Urgent Care for Adult Mental Health  [facility] residents only   [address]  [phone]    Walk-in Counseling Center  Free mental health counseling  [address]  [phone]    Mental Health Apps  My3  https://myClariFIpp.org/    VirtualHopeBox  https://[employer].org/apps/virtual-hope-box/    Suicide Safety Plan ([place])    Calm Harm                                                            "

## 2021-11-04 NOTE — ED NOTES
Pt has been resting through most of the shift watching TV. Pt continues to feel hopeful that he can discharge home today. Pt is anxious about getting back to work and states he feels a bit better. Pt continues to have a blank stare and poor eye contact. Pt denies hearing voices of having any thoughts of paranoia. Pt though is anxious. Pt denies any SI. Plan to discharge this afternoon to home [address] [address].

## 2021-11-04 NOTE — ED NOTES
Em[facility] Group Progress Note    Client Name: Ki Hdez  Date: November 3, 2021  Service Type:  Group Therapy  Session Start Time:  7:30pm  Session End Time: 7:55pm  Session Length: 25minutes  Attendees: Patient and other group members  Facilitator: JOVANI Matthews     Topic:   Mindfulness meditation techniques and how to implement at home.     Intervention:    Group process: support, challenge, affirm, psycho-education.     Response:  Patient did participate in group. Behavior in group was appropriate and engaged. Patient shared name and engaged in mindful movements and breathing activities.       JOVANI Matthews

## 2021-11-04 NOTE — ED PROVIDER NOTES
"EmPATH Unit - Psychiatric Observation Discharge Summary  [facility] Emergency Department  Discharge Date: 11/4/2021    Ki Hdez MRN: [id number]   Age: 35 year old YOB: 1985     Brief HPI & Initial ED Course     Chief Complaint   Patient presents with     Altered Mental Status     HPI  Ki Hdez is a 35 year old male with history notable for paranoia and insomnia who presented to the emergency room in a state of agitation while reporting auditory hallucinations and worsening paranoia.  He was transitioned to the empath unit yesterday at which time he met with CHEN Weeks who entered the patient to observation status and initiated Zyprexa to aid in treating psychosis.  He is being reassessed today.    He reports sleeping well for the past 2 nights which has resulted in some improvement in his mood and reduction of anxiety.  He is tolerating the medications well without side effects.  He feels more comfortable returning to the community and maintaining shelter with her friend.  He is open to individual therapy.  He is happy to continue taking these medications.  He denied suicidal and homicidal thoughts.  He did not reference hallucinations today.  He seem more comfortable with less paranoid concerns.        Physical Examination   BP: (!) 138/105  Pulse: 73  Temp: 99.1  F (37.3  C)  Resp: 16  Height: 167.6 cm (5' 5.98\")  Weight: 82 kg (180 lb 12.4 oz)  SpO2: 99 %    Physical Exam  General: Appears stated age.   Neuro: Alert and fully oriented. Extremities appear to demonstrate normal strength on visual inspection.   Integumentary/Skin: no rash visualized, normal color    Psychiatric Examination   Appearance: awake, alert  Attitude:  cooperative  Eye Contact:  fair  Mood:  better  Affect:  mood congruent  Speech:  clear, coherent  Psychomotor Behavior:  no evidence of tardive dyskinesia, dystonia, or tics  Thought Process:  linear  Associations:  no loose associations  Thought Content:  no " evidence of suicidal ideation or homicidal ideation and no evidence of psychotic thought  Insight:  fair  Judgement:  intact  Oriented to:  time, person, and place  Attention Span and Concentration:  fair  Recent and Remote Memory:  fair  Language: able to name/identify objects without impairment  Fund of Knowledge: intact with awareness of current and past events    Results        Labs Ordered and Resulted from Time of ED Arrival to Time of ED Departure   ACETAMINOPHEN LEVEL - Abnormal       Result Value    Acetaminophen <2 (*)    BASIC METABOLIC PANEL - Abnormal    Sodium 137      Potassium 3.4      Chloride 104      Carbon Dioxide (CO2) 22      Anion Gap 11      Urea Nitrogen 17      Creatinine 1.14      Calcium 8.7      Glucose 165 (*)     GFR Estimate 82     RBC AND PLATELET MORPHOLOGY - Abnormal    Platelet Assessment        Value: Automated Count Confirmed. Platelet morphology is normal.    Smudge Cells Present (*)     RBC Morphology Confirmed RBC Indices     COVID-19 VIRUS (CORONAVIRUS) BY PCR - Normal    SARS CoV2 PCR Negative     ETHYL ALCOHOL LEVEL - Normal    Alcohol ethyl <0.01     SALICYLATE LEVEL - Normal    Salicylate <2     DRUG ABUSE SCREEN 77 URINE (FL, RH, SH) - Normal    Amphetamines Urine Screen Negative      Barbiturates Urine Screen Negative      Benzodiazepines Urine Screen Negative      Cannabinoids Urine Screen Negative      Cocaine Urine Screen Negative      Opiates Urine Screen Negative      PCP Urine Screen Negative     CBC WITH PLATELETS AND DIFFERENTIAL    WBC Count 9.6      RBC Count 5.12      Hemoglobin 14.5      Hematocrit 43.6      MCV 85      MCH 28.3      MCHC 33.3      RDW 13.8      Platelet Count 230      % Neutrophils 38      % Lymphocytes 55      % Monocytes 7      % Eosinophils 0      % Basophils 0      % Immature Granulocytes 0      NRBCs per 100 WBC 0      Absolute Neutrophils 3.6      Absolute Lymphocytes 5.2      Absolute Monocytes 0.7      Absolute Eosinophils 0.0       Absolute Basophils 0.0      Absolute Immature Granulocytes 0.0      Absolute NRBCs 0.0         Observation Course   The patient was found to have a psychiatric condition that would benefit from an observation stay in the emergency department for further psychiatric stabilization and/or coordination of a safe disposition. The plan upon observation admission included serial assessments of psychiatric condition, potential administration of medications if indicated, further disposition pending the patient's psychiatric course during the monitoring period.     Serial assessments of the patient's psychiatric condition were performed. Nursing notes were reviewed. During the observation period, the patient did not require medications for agitation, and did not require restraints/seclusion for patient and/or provider safety.     After a period of working with the treatment team on the EmPATH unit, the patient's mental state improved to allow a safe transition to outpatient care. After counseling on the diagnosis, work-up, and treatment plan, the patient was discharged. Close follow-up with a psychiatrist and/or therapist was recommended and community psychiatric resources were provided. Patient is to return to the ED if any urgent or potentially life-threatening concerns.     Discharge Diagnoses:   Final diagnoses:   Psychosis, unspecified psychosis type (H) - likely schizophrenia   Agitation   Nonadherence to medication   Paranoia (H)       Treatment Plan:  -Continue Zyprexa 10 mg at bedtime targeting reduction of psychosis.  -Continue prazosin to aid in reducing nighttime anxiety and promote sleep.  As a secondary benefit, it may help reduce his elevated blood pressure as it continues to be slightly elevated.  He was encouraged to follow-up with his primary care physician to be evaluated for hypertension.  -Referral for individual psychotherapy and medication management  -Discharge home today.      At the time of  discharge, the patient's acute suicide risk was determined to be low due to the following factors: Reduction in the intensity of mood/anxiety symptoms that preceded the admission, denial of suicidal thoughts, denies feeling helpless or helpless, not currently under the influence of alcohol or illicit substances, denies experiencing command hallucinations, no immediate access to firearms. The patient's acute risk could be higher if noncompliant with their treatment plan, medications, follow-up appointments or using illicit substances or alcohol. Protective factors include: social supports    --  Maikel Childers MD  [facility] EMERGENCY DEPT  EmPATH Unit  11/4/2021      Maikel Childers MD  11/04/21 3759

## 2021-11-04 NOTE — ED NOTES
Patient agrees to discharge plan. Discharge instructions reviewed with patient including follow-up care plan. Medications: sent with pt. Reviewed safety plan and outpatient resources. Denies SI and HI. All belongings that were brought into the hospital have been returned to patient. Escorted off the unit at 1632 accompanied by [facility] staff. Discharged to home (friends house) via cab.

## 2021-11-05 ENCOUNTER — PATIENT OUTREACH (OUTPATIENT)
Dept: CARE COORDINATION | Facility: CLINIC | Age: 36
End: 2021-11-05

## 2021-11-05 DIAGNOSIS — Z71.89 OTHER SPECIFIED COUNSELING: ICD-10-CM

## 2021-11-06 NOTE — PROGRESS NOTES
Clinic Care Coordination Contact  [facility]/Voicemail       Clinical Data: Care Coordinator Outreach  Outreach attempted x 2.  Left message on patient's voicemail with call back information and requested return call.    Plan: Care Coordinator will do no further outreaches at this time.    CHUCKY Underwood  [phone]  [facility]

## 2021-11-17 ENCOUNTER — HOSPITAL ENCOUNTER (OUTPATIENT)
Facility: CLINIC | Age: 36
Setting detail: OBSERVATION
Discharge: HOME | End: 2021-11-18
Attending: EMERGENCY MEDICINE | Admitting: EMERGENCY MEDICINE
Payer: COMMERCIAL

## 2021-11-17 DIAGNOSIS — F10.929 ALCOHOLIC INTOXICATION WITH COMPLICATION (H): ICD-10-CM

## 2021-11-17 DIAGNOSIS — F22 PARANOIA (H): ICD-10-CM

## 2021-11-17 DIAGNOSIS — Z91.148 NONCOMPLIANCE WITH MEDICATION REGIMEN: ICD-10-CM

## 2021-11-17 DIAGNOSIS — F29 SCHIZOPHRENIA SPECTRUM DISORDER WITH PSYCHOTIC DISORDER TYPE NOT YET DETERMINED (H): ICD-10-CM

## 2021-11-17 LAB
ALBUMIN SERPL-MCNC: 4.3 G/DL (ref 3.4–5)
ALP SERPL-CCNC: 102 U/L (ref 40–150)
ALT SERPL W P-5'-P-CCNC: 78 U/L (ref 0–70)
AMPHETAMINES UR QL SCN: NORMAL
ANION GAP SERPL CALCULATED.3IONS-SCNC: 14 MMOL/L (ref 3–14)
AST SERPL W P-5'-P-CCNC: 77 U/L (ref 0–45)
ATRIAL RATE - MUSE: 89 BPM
ATRIAL RATE - MUSE: 95 BPM
BARBITURATES UR QL: NORMAL
BASOPHILS # BLD AUTO: 0 10E3/UL (ref 0–0.2)
BASOPHILS NFR BLD AUTO: 1 %
BENZODIAZ UR QL: NORMAL
BILIRUB SERPL-MCNC: 0.3 MG/DL (ref 0.2–1.3)
BUN SERPL-MCNC: 8 MG/DL (ref 7–30)
CALCIUM SERPL-MCNC: 9.1 MG/DL (ref 8.5–10.1)
CANNABINOIDS UR QL SCN: NORMAL
CHLORIDE BLD-SCNC: 104 MMOL/L (ref 94–109)
CO2 SERPL-SCNC: 21 MMOL/L (ref 20–32)
COCAINE UR QL: NORMAL
CREAT SERPL-MCNC: 0.84 MG/DL (ref 0.66–1.25)
DIASTOLIC BLOOD PRESSURE - MUSE: NORMAL MMHG
DIASTOLIC BLOOD PRESSURE - MUSE: NORMAL MMHG
EOSINOPHIL # BLD AUTO: 0 10E3/UL (ref 0–0.7)
EOSINOPHIL NFR BLD AUTO: 0 %
ERYTHROCYTE [DISTWIDTH] IN BLOOD BY AUTOMATED COUNT: 13.6 % (ref 10–15)
ETHANOL SERPL-MCNC: 0.24 G/DL
GFR SERPL CREATININE-BSD FRML MDRD: >90 ML/MIN/1.73M2
GLUCOSE BLD-MCNC: 95 MG/DL (ref 70–99)
GLUCOSE BLDC GLUCOMTR-MCNC: 97 MG/DL (ref 70–99)
HCT VFR BLD AUTO: 41.8 % (ref 40–53)
HGB BLD-MCNC: 14.4 G/DL (ref 13.3–17.7)
HOLD SPECIMEN: NORMAL
HOLD SPECIMEN: NORMAL
IMM GRANULOCYTES # BLD: 0 10E3/UL
IMM GRANULOCYTES NFR BLD: 0 %
INTERPRETATION ECG - MUSE: NORMAL
INTERPRETATION ECG - MUSE: NORMAL
LYMPHOCYTES # BLD AUTO: 1.3 10E3/UL (ref 0.8–5.3)
LYMPHOCYTES NFR BLD AUTO: 27 %
MCH RBC QN AUTO: 28.3 PG (ref 26.5–33)
MCHC RBC AUTO-ENTMCNC: 34.4 G/DL (ref 31.5–36.5)
MCV RBC AUTO: 82 FL (ref 78–100)
MONOCYTES # BLD AUTO: 0.2 10E3/UL (ref 0–1.3)
MONOCYTES NFR BLD AUTO: 5 %
NEUTROPHILS # BLD AUTO: 3.2 10E3/UL (ref 1.6–8.3)
NEUTROPHILS NFR BLD AUTO: 67 %
NRBC # BLD AUTO: 0 10E3/UL
NRBC BLD AUTO-RTO: 0 /100
OPIATES UR QL SCN: NORMAL
P AXIS - MUSE: 29 DEGREES
P AXIS - MUSE: 32 DEGREES
PCP UR QL SCN: NORMAL
PLATELET # BLD AUTO: 315 10E3/UL (ref 150–450)
POTASSIUM BLD-SCNC: 3.4 MMOL/L (ref 3.4–5.3)
PR INTERVAL - MUSE: 104 MS
PR INTERVAL - MUSE: 104 MS
PROT SERPL-MCNC: 8.5 G/DL (ref 6.8–8.8)
QRS DURATION - MUSE: 102 MS
QRS DURATION - MUSE: 110 MS
QT - MUSE: 348 MS
QT - MUSE: 356 MS
QTC - MUSE: 433 MS
QTC - MUSE: 437 MS
R AXIS - MUSE: 11 DEGREES
R AXIS - MUSE: 7 DEGREES
RBC # BLD AUTO: 5.09 10E6/UL (ref 4.4–5.9)
SARS-COV-2 RNA RESP QL NAA+PROBE: NEGATIVE
SODIUM SERPL-SCNC: 139 MMOL/L (ref 133–144)
SYSTOLIC BLOOD PRESSURE - MUSE: NORMAL MMHG
SYSTOLIC BLOOD PRESSURE - MUSE: NORMAL MMHG
T AXIS - MUSE: -26 DEGREES
T AXIS - MUSE: -31 DEGREES
TROPONIN I SERPL-MCNC: <0.015 UG/L (ref 0–0.04)
TROPONIN I SERPL-MCNC: <0.015 UG/L (ref 0–0.04)
TROPONIN T BLD-MCNC: 0 UG/L
VENTRICULAR RATE- MUSE: 89 BPM
VENTRICULAR RATE- MUSE: 95 BPM
WBC # BLD AUTO: 4.7 10E3/UL (ref 4–11)

## 2021-11-17 PROCEDURE — C9803 HOPD COVID-19 SPEC COLLECT: HCPCS

## 2021-11-17 PROCEDURE — G0378 HOSPITAL OBSERVATION PER HR: HCPCS

## 2021-11-17 PROCEDURE — 93005 ELECTROCARDIOGRAM TRACING: CPT | Mod: 76

## 2021-11-17 PROCEDURE — 80053 COMPREHEN METABOLIC PANEL: CPT | Performed by: EMERGENCY MEDICINE

## 2021-11-17 PROCEDURE — 93005 ELECTROCARDIOGRAM TRACING: CPT

## 2021-11-17 PROCEDURE — 87635 SARS-COV-2 COVID-19 AMP PRB: CPT | Performed by: EMERGENCY MEDICINE

## 2021-11-17 PROCEDURE — 84484 ASSAY OF TROPONIN QUANT: CPT | Performed by: EMERGENCY MEDICINE

## 2021-11-17 PROCEDURE — 85025 COMPLETE CBC W/AUTO DIFF WBC: CPT | Performed by: EMERGENCY MEDICINE

## 2021-11-17 PROCEDURE — 99291 CRITICAL CARE FIRST HOUR: CPT | Mod: 25

## 2021-11-17 PROCEDURE — 250N000013 HC RX MED GY IP 250 OP 250 PS 637: Performed by: NURSE PRACTITIONER

## 2021-11-17 PROCEDURE — 99220 PR INITIAL OBSERVATION CARE,LEVEL III: CPT | Performed by: NURSE PRACTITIONER

## 2021-11-17 PROCEDURE — 250N000011 HC RX IP 250 OP 636

## 2021-11-17 PROCEDURE — 80307 DRUG TEST PRSMV CHEM ANLYZR: CPT | Performed by: EMERGENCY MEDICINE

## 2021-11-17 PROCEDURE — 82077 ASSAY SPEC XCP UR&BREATH IA: CPT | Performed by: EMERGENCY MEDICINE

## 2021-11-17 PROCEDURE — 96374 THER/PROPH/DIAG INJ IV PUSH: CPT

## 2021-11-17 PROCEDURE — 250N000011 HC RX IP 250 OP 636: Performed by: EMERGENCY MEDICINE

## 2021-11-17 PROCEDURE — 84484 ASSAY OF TROPONIN QUANT: CPT

## 2021-11-17 PROCEDURE — 36415 COLL VENOUS BLD VENIPUNCTURE: CPT | Performed by: EMERGENCY MEDICINE

## 2021-11-17 PROCEDURE — 99292 CRITICAL CARE ADDL 30 MIN: CPT

## 2021-11-17 PROCEDURE — 99285 EMERGENCY DEPT VISIT HI MDM: CPT | Mod: 25

## 2021-11-17 PROCEDURE — 258N000003 HC RX IP 258 OP 636: Performed by: EMERGENCY MEDICINE

## 2021-11-17 RX ORDER — SODIUM CHLORIDE 9 MG/ML
INJECTION, SOLUTION INTRAVENOUS CONTINUOUS
Status: DISCONTINUED | OUTPATIENT
Start: 2021-11-17 | End: 2021-11-18 | Stop reason: HOSPADM

## 2021-11-17 RX ORDER — DIAZEPAM 5 MG
10 TABLET ORAL EVERY 30 MIN PRN
Status: DISCONTINUED | OUTPATIENT
Start: 2021-11-17 | End: 2021-11-18 | Stop reason: HOSPADM

## 2021-11-17 RX ORDER — HALOPERIDOL 5 MG/ML
INJECTION INTRAMUSCULAR
Status: COMPLETED
Start: 2021-11-17 | End: 2021-11-17

## 2021-11-17 RX ORDER — DIAZEPAM 10 MG/2ML
5-10 INJECTION, SOLUTION INTRAMUSCULAR; INTRAVENOUS EVERY 30 MIN PRN
Status: DISCONTINUED | OUTPATIENT
Start: 2021-11-17 | End: 2021-11-18 | Stop reason: HOSPADM

## 2021-11-17 RX ORDER — FLUMAZENIL 0.1 MG/ML
0.2 INJECTION, SOLUTION INTRAVENOUS
Status: DISCONTINUED | OUTPATIENT
Start: 2021-11-17 | End: 2021-11-18 | Stop reason: HOSPADM

## 2021-11-17 RX ORDER — HALOPERIDOL 5 MG/ML
5 INJECTION INTRAMUSCULAR ONCE
Status: COMPLETED | OUTPATIENT
Start: 2021-11-17 | End: 2021-11-17

## 2021-11-17 RX ORDER — RISPERIDONE 2 MG/1
2 TABLET ORAL AT BEDTIME
Status: DISCONTINUED | OUTPATIENT
Start: 2021-11-17 | End: 2021-11-17

## 2021-11-17 RX ADMIN — HALOPERIDOL LACTATE 5 MG: 5 INJECTION, SOLUTION INTRAMUSCULAR at 14:56

## 2021-11-17 RX ADMIN — HALOPERIDOL LACTATE 5 MG: 5 INJECTION, SOLUTION INTRAMUSCULAR at 10:30

## 2021-11-17 RX ADMIN — SODIUM CHLORIDE 1000 ML: 9 INJECTION, SOLUTION INTRAVENOUS at 12:13

## 2021-11-17 RX ADMIN — RISPERIDONE 2 MG: 2 TABLET ORAL at 21:45

## 2021-11-17 ASSESSMENT — MIFFLIN-ST. JEOR: SCORE: 1712.36

## 2021-11-17 NOTE — ED PROVIDER NOTES
History     Chief Complaint:  Altered Mental Status     The history is provided by the patient and the police. History limited by: altered mental status.      Ki Keene is a 35 year old male with no significant past medical history who presents with altered mental status. [place] [employer] was called this morning as Ki was acting erratically. He was reportedly walking into the street, banging on cars, stopping traffic, and yelling at bystanders. Police noted that he appeared intoxicated and was not communicating coherently. PD mentions that Ki has an address in [place] but an ID from [place].    Review of Systems   Unable to perform ROS: Mental status change   Psychiatric/Behavioral: Positive for behavioral problems.     Allergies:  The patient has no known allergies.    Medications:  The patient is currently on no regular medications.     Past Medical History:     The patient denies past medical history.    Social History:  Patient presents to the ED alone.  Patient presents to the ED via police.    Physical Exam     Patient Vitals for the past 24 hrs:   BP Pulse Resp SpO2   11/17/21 1330 137/88 88 27 --   11/17/21 1315 -- 96 11 --   11/17/21 1300 -- 104 12 --   11/17/21 1245 (!) 148/95 118 9 --   11/17/21 1230 (!) 163/107 117 17 --   11/17/21 1100 -- 108 12 90 %   11/17/21 1030 (!) 164/101 (!) 125 -- --   11/17/21 1024 -- -- -- 97 %   11/17/21 1021 (!) 167/98 (!) 133 -- 96 %   11/17/21 1019 (!) 173/109 (!) 135 -- --     Physical Exam  Vitals: reviewed by me  General: Pt seen on [facility], aggressive, intoxicated appearing.  Eyes: Tracking well, clear conjunctiva BL  ENT: MMM, midline trachea.  No evidence of trauma to head or neck  Lungs: No tachypnea, no accessory muscle use. No respiratory distress.   CV: Rate as above  Abd: Soft, non tender, no guarding, no rebound. Non distended  MSK: no joint effusion.  No evidence of trauma  Skin: No rash  Neuro: Slurred speech and no facial droop.   Moving all extremities spontaneously  Psych: Agitated      Emergency Department Course     ECG #1  ECG taken at 1157, ECG read at 1230  Sinus rhythm with short TX  Left ventricular hypertrophy with repolarization abnormality  Inferior infarct, age undetermined  Lateral injury pattern  ACUTE MI/STEMI  Abnormal ECG  Rate 89 bpm. TX interval 104 ms. QRS duration 110 ms. QT/QTc 356/433 ms. P-R-T axes 32 11 -26.     ECG #2  ECG taken at 1224, ECG read at 1230  Sinus rhythm with short TX  Voltage criteria for left ventricular hypertrophy   Inferior infarct, age undetermined  Lateral injury pattern  ACUTE MI/STEMI  Abnormal ECG  Rate 95 bpm. TX interval 104 ms. QRS duration 102 ms. QT/QTc 348/437 ms. P-R-T axes 29 7 -31.     Laboratory:  Labs Ordered and Resulted from Time of ED Arrival to Time of ED Departure   COMPREHENSIVE METABOLIC PANEL - Abnormal       Result Value    Sodium 139      Potassium 3.4      Chloride 104      Carbon Dioxide (CO2) 21      Anion Gap 14      Urea Nitrogen 8      Creatinine 0.84      Calcium 9.1      Glucose 95      Alkaline Phosphatase 102      AST 77 (*)     ALT 78 (*)     Protein Total 8.5      Albumin 4.3      Bilirubin Total 0.3      GFR Estimate >90     ETHYL ALCOHOL LEVEL - Abnormal    Alcohol ethyl 0.24 (*)    COVID-19 VIRUS (CORONAVIRUS) BY PCR - Normal    SARS CoV2 PCR Negative     GLUCOSE BY METER - Normal    GLUCOSE BY METER POCT 97     TROPONIN I - Normal    Troponin I <0.015     DRUG ABUSE SCREEN 77 URINE (FL, RH, SH) - Normal    Amphetamines Urine Screen Negative      Barbiturates Urine Screen Negative      Benzodiazepines Urine Screen Negative      Cannabinoids Urine Screen Negative      Cocaine Urine Screen Negative      Opiates Urine Screen Negative      PCP Urine Screen Negative     ISTAT TROPONIN POCT - Normal    TROPPC POCT 0.00     GLUCOSE MONITOR NURSING POCT   CBC WITH PLATELETS AND DIFFERENTIAL    WBC Count 4.7      RBC Count 5.09      Hemoglobin 14.4      Hematocrit  41.8      MCV 82      MCH 28.3      MCHC 34.4      RDW 13.6      Platelet Count 315      % Neutrophils 67      % Lymphocytes 27      % Monocytes 5      % Eosinophils 0      % Basophils 1      % Immature Granulocytes 0      NRBCs per 100 WBC 0      Absolute Neutrophils 3.2      Absolute Lymphocytes 1.3      Absolute Monocytes 0.2      Absolute Eosinophils 0.0      Absolute Basophils 0.0      Absolute Immature Granulocytes 0.0      Absolute NRBCs 0.0     TROPONIN I     Emergency Department Course:    Reviewed:  I reviewed nursing notes, vitals, past medical history    Assessments:  1017 I obtained history and examined the patient as noted above.   1206 I rechecked the patient. He denies any chest pain. He is able to tell me his name. He is asking for the restraints to be removed and we discussed removing this if his behavior continues to be good.    Consults:   1231 I consulted Dr. Schaeffer from cardiology regarding the patient's presentation and EKG findings.   1239 Dr. Schaeffer looked at the patient's EKG and agrees that there is no STEMI. We discussed performing serial troponins.     Interventions:  1030 Haldol 5 mg IM  1213 NS 1 L IV    Disposition:  Care of the patient was transferred to my colleague Dr. Field pending laboratory work.     Impression & Plan     Medical Decision Making:  Ki Keene is a 35 year old male who presents to the emergency department with what appears to be altered mental status and bizarre behavior on the street.  He was unable to tell me exactly what happened, though his labs are coming back and it does seem as though he is significantly intoxicated.  This is in keeping with the police report of him smelling like alcohol.  He is actually done okay here but did require Haldol and restraints for some time.  His screening EKG which was done for toxicology purposes, showed possible ST elevation in several leads, and this was taken very seriously.  Thankfully however his i-STAT  troponin done at that time was negative, and his repeat EKG had not changed, prompting a phone call to cardiology.  Cardiology agreed that in the absence of chest pain, and with a different narrative at this time and no known medical history that would put him at risk for this, he is okay for watchful waiting.  This is exactly what we are doing, he is pending a second troponin, and metabolization of his alcohol until he reaches clinical sobriety.    Critical care time 30 minutes.    Diagnosis:    ICD-10-CM    1. Alcoholic intoxication with complication (H)  F10.929      Scribe Disclosure:  I, Joycelyn Medina, am serving as a scribe at 10:13 AM on 11/17/2021 to document services personally performed by Homer Vidal MD based on my observations and the provider's statements to me.       Homer Vidal MD  11/17/21 1426

## 2021-11-17 NOTE — ED NOTES
"Pt was sitting up on bed. Restraints readjusted. Pt then stated he needs to pee. Pt turned supine and primo pro placed. Pt is unable to to have meaningful conversation and just keeps repeating \"in [place] name\".   "

## 2021-11-17 NOTE — ED NOTES
Patient taken out of restraints. Agrees to be calm. As soon as patient got out of restraints he took out his IV and started taking off his cardiac monitor. Told patient that he couldn't take his cardiac monitor off and that he had to stay in bed. Patient put his feet up in bed and side rails were put up.

## 2021-11-17 NOTE — ED PROVIDER NOTES
Patient was signed out to me by Dr. Vidal.  He came in intoxicated and altered.  After he sobered up appropriately, I subsequently spoke with the patient using an .  It turns out that he does have some mental health issues and probably schizophrenia.  He indicates that he has been off of his medications for an unknown amount of time, and he has had increasing thoughts of paranoia and people following him as well as hearing things.  He denies being suicidal.  I felt it was best that he be evaluated by our psychiatric staff, and therefore he was transferred to the EmPATH unit for further evaluation and management.     David Field MD  11/17/21 [id number]

## 2021-11-17 NOTE — ED NOTES
"At 1015 pt was asked multiple times to sit in the bed to get vital signs, pt repeatedly asked \"What?\".  Once pt was seated he attempted to get up quickly and move past the security guards. Pt was detained by security and the [place] . He was placed in 5 point restraints. Dr Vidal at the bedside.   "

## 2021-11-17 NOTE — ED NOTES
Patient got up out of bed. Took off all his cardiac monitors. Pt started to try and walk down the hallway. Patient escorted back to his room. Patient then began struggling against staff. Security placed patient in bed and restraints placed back on the patient. Patient shows to evidence of understanding that he needs to remain cooperative.

## 2021-11-17 NOTE — ED TRIAGE NOTES
"Pt brought in by [place] police. Pt was found to be wandering into traffic and yelling out to nearby landscapers. Pt was not answering questions appropriately when asked. He was accompanied to  with police and 4 security guards. He repetatively asked \"What?\" when asked to sit on the bed. Pt stated he wanted to leave.   "

## 2021-11-17 NOTE — ED NOTES
Patient asking for RN to remove his restraints. RN explained to him that he has to show that he's understanding that he can't fight staff in order to get his restraints removed. RN replaced patient's cardiac monitors and asked patient to please leave them on. Patient immediated pulled at the cords and took them off. Patient then repeated that he would like to get out of restraints. RN again explained to patient that he would have to remain calm and not fight against staff if he would like them removed. Patient just stared at RN, showed no sign of learning. Will continue to keep patient in restraints at this time.

## 2021-11-17 NOTE — ED NOTES
Bed: [facility]  Expected date:   Expected time:   Means of arrival:   Comments:  Triage, intoxicated on a hold PD here

## 2021-11-18 VITALS
TEMPERATURE: 98.3 F | HEIGHT: 68 IN | RESPIRATION RATE: 18 BRPM | WEIGHT: 178.1 LBS | HEART RATE: 94 BPM | SYSTOLIC BLOOD PRESSURE: 143 MMHG | BODY MASS INDEX: 26.99 KG/M2 | OXYGEN SATURATION: 97 % | DIASTOLIC BLOOD PRESSURE: 103 MMHG

## 2021-11-18 PROCEDURE — 99217 PR OBSERVATION CARE DISCHARGE: CPT | Performed by: PSYCHIATRY & NEUROLOGY

## 2021-11-18 PROCEDURE — G0378 HOSPITAL OBSERVATION PER HR: HCPCS

## 2021-11-18 RX ORDER — RISPERIDONE 3 MG/1
3 TABLET ORAL AT BEDTIME
Refills: 0 | COMMUNITY
Start: 2021-11-18

## 2021-11-18 NOTE — ED NOTES
"Patient is up and ordered breakfast. Patient states, \"I feel better today. Still hearing some voices. But I'm doing okay\". Patient remains calm, pleasant, and cooperative. Scored a 1 on CIWA scale.   "

## 2021-11-18 NOTE — ED PROVIDER NOTES
"[facility] Unit - Psychiatric Observation Discharge Summary  [facility] Emergency Department  Discharge Date: 11/18/2021    Ki Keene MRN: [id number]   Age: 35 year old YOB: 1985     Brief HPI & Initial ED Course     Chief Complaint   Patient presents with     Altered Mental Status     HPI  Ki Keene is a 35 year old male with a past history notable for psychosis, paranoia, insomnia, who presents to the ED intoxicated and in a state of agitation while reporting auditory hallucinations and worsening paranoia.  Police report he was found acting erratically; walking into streets, stopping traffic, banging on cars, and yelling at bystanders.  He required physical restraints and IM Haldol 5 mg twice for behavior control.  He was medically cleared and transferred to [facility] for psychiatric assessment.  He initially met with CHEN Weeks who entered the patient to observation status and we started Zyprexa noting a recent period of nonadherence and alcohol use which likely contributed to his presentation.  Overnight, there were no acute issues.  The patient is taking his medications and slept well.  This morning, he reports feeling much better.  He outlined a plan that reassured the importance of maintaining adherence with his medications and abstaining from alcohol.  He was not experiencing any alcohol withdrawal symptoms.  He was not seeking any additional resources for substance use disorder treatment.  He reports having a supply of medications at home to resume.  He denied auditory or visual hallucinations today.  There was no indication of paranoia.  He denied suicidal and homicidal thoughts and feels ready to transition back home.        Physical Examination   BP: (!) 143/103  Pulse: 94  Temp: 98.3  F (36.8  C)  Resp: 18  Height: 172.7 cm (5' 8\")  Weight: 80.8 kg (178 lb 1.6 oz)  SpO2: 97 %    Physical Exam  General: Appears stated age.   Neuro: Alert and fully oriented. Extremities appear to " demonstrate normal strength on visual inspection.   Integumentary/Skin: no rash visualized, normal color    Psychiatric Examination   Appearance: awake, alert  Attitude:  cooperative  Eye Contact:  fair  Mood:  better  Affect:  intensity is blunted  Speech:  clear, coherent  Psychomotor Behavior:  no evidence of tardive dyskinesia, dystonia, or tics  Thought Process:  logical  Associations:  no loose associations  Thought Content:  no evidence of suicidal ideation or homicidal ideation and no evidence of psychotic thought  Insight:  fair  Judgement:  fair  Oriented to:  time, person, and place  Attention Span and Concentration:  fair  Recent and Remote Memory:  fair  Language: able to name/identify objects without impairment  Fund of Knowledge: intact with awareness of current and past events    Results        Labs Ordered and Resulted from Time of ED Arrival to Time of ED Departure   COMPREHENSIVE METABOLIC PANEL - Abnormal       Result Value    Sodium 139      Potassium 3.4      Chloride 104      Carbon Dioxide (CO2) 21      Anion Gap 14      Urea Nitrogen 8      Creatinine 0.84      Calcium 9.1      Glucose 95      Alkaline Phosphatase 102      AST 77 (*)     ALT 78 (*)     Protein Total 8.5      Albumin 4.3      Bilirubin Total 0.3      GFR Estimate >90     ETHYL ALCOHOL LEVEL - Abnormal    Alcohol ethyl 0.24 (*)    COVID-19 VIRUS (CORONAVIRUS) BY PCR - Normal    SARS CoV2 PCR Negative     GLUCOSE BY METER - Normal    GLUCOSE BY METER POCT 97     TROPONIN I - Normal    Troponin I <0.015     DRUG ABUSE SCREEN 77 URINE (FL, RH, SH) - Normal    Amphetamines Urine Screen Negative      Barbiturates Urine Screen Negative      Benzodiazepines Urine Screen Negative      Cannabinoids Urine Screen Negative      Cocaine Urine Screen Negative      Opiates Urine Screen Negative      PCP Urine Screen Negative     ISTAT TROPONIN POCT - Normal    TROPPC POCT 0.00     TROPONIN I - Normal    Troponin I <0.015     GLUCOSE MONITOR  NURSING POCT   CBC WITH PLATELETS AND DIFFERENTIAL    WBC Count 4.7      RBC Count 5.09      Hemoglobin 14.4      Hematocrit 41.8      MCV 82      MCH 28.3      MCHC 34.4      RDW 13.6      Platelet Count 315      % Neutrophils 67      % Lymphocytes 27      % Monocytes 5      % Eosinophils 0      % Basophils 1      % Immature Granulocytes 0      NRBCs per 100 WBC 0      Absolute Neutrophils 3.2      Absolute Lymphocytes 1.3      Absolute Monocytes 0.2      Absolute Eosinophils 0.0      Absolute Basophils 0.0      Absolute Immature Granulocytes 0.0      Absolute NRBCs 0.0         Observation Course   The patient was found to have a psychiatric condition that would benefit from an observation stay in the emergency department for further psychiatric stabilization and/or coordination of a safe disposition. The plan upon observation admission included serial assessments of psychiatric condition, potential administration of medications if indicated, further disposition pending the patient's psychiatric course during the monitoring period.     Serial assessments of the patient's psychiatric condition were performed. Nursing notes were reviewed. During the observation period, the patient did not require medications for agitation, and did not require restraints/seclusion for patient and/or provider safety.     After a period of working with the treatment team on the [facility] unit, the patient's mental state improved to allow a safe transition to outpatient care. After counseling on the diagnosis, work-up, and treatment plan, the patient was discharged. Close follow-up with a psychiatrist and/or therapist was recommended and community psychiatric resources were provided. Patient is to return to the ED if any urgent or potentially life-threatening concerns.     Discharge Diagnoses:   Final diagnoses:   Alcoholic intoxication with complication (H)   Paranoia (H)   Noncompliance with medication regimen   Schizophrenia spectrum  disorder with psychotic disorder type not yet determined (H)       Treatment Plan:  -Resume Risperdal 3 mg at bedtime.  The patient reports having a supply of this medication at home to resume.  -Resume outpatient medication management appointments.  -The patient was offered the opportunity to complete a chemical health assessment to gain additional resources for substance use disorder treatment however declined.  He foresees the ability to maintain abstinence on his own.  He is not experiencing withdrawal symptoms on examination.  -Discharge home today.      At the time of discharge, the patient's acute suicide risk was determined to be low due to the following factors: Reduction in the intensity of mood/anxiety symptoms that preceded the admission, denial of suicidal thoughts, denies feeling helpless or helpless, not currently under the influence of alcohol or illicit substances, denies experiencing command hallucinations, no immediate access to firearms. The patient's acute risk could be higher if noncompliant with their treatment plan, medications, follow-up appointments or using illicit substances or alcohol. Protective factors include: social supports, stable housing    --  Maikel Childers MD  [facility] EMERGENCY DEPT  [facility] Unit  11/18/2021      Maikel Childers MD  11/18/21 [id number]

## 2021-11-18 NOTE — PROGRESS NOTES
Ki is a 35 year old male with history of schizoaffective, anxiety and paranoia received from ED due to erratic behaviors per EMS called by stand by people. Pt, it is stated was walking into traffic and heating cars and yelling at [employer]  Reports he has been increasingly hearing voices for the past three days and people attempting to hurt him. Pt was here just about 11/02/21 with same complain, denies SI/HI but admits he has been drinking like a pint of siminof Vodka. He came in with 0.111 intoxication.Pt speaks and understand english if spoken slowly and clearly.  Nursing and risk assessments completed. Assessments reviewed with LMHP and physician. Video monitoring in progress, patient informed.  Admission information reviewed with patient. Patient given a tour of [facility] and instructions on using the facility. Questions regarding [facility] addressed. Pt search completed and belongings inventoried.

## 2021-11-18 NOTE — ED NOTES
[facility] Reassessment and Progress Note    Client Name: Ki Keene  Date: November 18, 2021       Presenting issue that brought patient to the [facility] unit: Pt was brought to the [facility] unit after being brought to the ED by EMS for erratic behavior such as standing in traffic, stopping cars, hitting them with his fists, and talking about killing himself to others. Pt was intoxicated and had a .24 TIFFANY upon arrival. Pt reports not taking his medication due to misplacing it. Pt reported struggles of sleeping and believing people had been following him.    Current presentation on the unit: Pt presents as calm, cooperative, alert, oriented. Today denies SI/HI, AH/VH. Pt discussed feeling ready to go home and feeling better than he did last night.    Current risk to self or others? No    Summary of therapeutic interventions completed with patient: Establishing rapport, active listening, assess dimensions of crisis, Apply solution-focused therapy to address current crisis, Identify additional supports and alternative coping skills, Establish a discharge plan and Safety planning.    Treatment objectives addressed in this session: There was no treatment plan created.    Progress on treatment goals: No treatment plan created.     Additional collateral information: None at this time.     Mental Status:     Appearance:   Appropriate    Eye Contact:   Fair    Psychomotor Behavior: Normal    Attitude:   Cooperative    Orientation:   All   Speech    Rate / Production: Normal/ Responsive    Volume:  Normal    Mood:    Normal   Affect:    Blunted    Thought Content:  Clear    Thought Form:  Coherent    Insight:    Fair       Plan: Pt will be discharged home due to Pt not endorsing SI/HI, AH/VH and feels ready to go home.     Disposition: Other: Pt will discharge back home.    Rationale for disposition: Pt will discharge back home due to not endorsing SI/HI, AH/VH.    If I am feeling unsafe or I am in a crisis, I will:    Contact my established care providers   Call the National Suicide Prevention Lifeline: 954.867.9955   Go to the nearest emergency room   Call 170         Warning signs that I or other people might notice when a crisis is developing for me: Feeling paranoid, start drinking, not sleeping.     Things I am able to do on my own to cope or help me feel better: Taking medication, watching tv, listening to music.      Things that I am able to do with others to cope or help me better: Talk to my cousin, Adrian and friends.       Things I can use or do for distraction: Working, go to [employer].      Changes I can make to support my mental health and wellness: Take my medications and stop drinking.      People in my life that I can ask for help: Cousin Adrian and friends.      Your [facility] has a mental health crisis team you can call 24/7: [facility] Crisis Line Number: [phone]      Other things that are important when I m in crisis: Ask for help from my friends, come back to the hospital.         Reviewed assessment with attending provider: Dr. Childers    Total time spent with patient:.50 hrs     CPT code: 11017 - Psychotherapy (with patient) - 30 (16-37*) min         Alejo Gonzalez [facility]

## 2021-11-18 NOTE — PROGRESS NOTES
Pt woke to use the BR, he has no complaints and denies any withdrawal sx. Denies SI/HI, or hallucinations. Calm, cooperative, went back to sleep.

## 2021-11-18 NOTE — DISCHARGE INSTRUCTIONS
Today came in very intoxicated, please consider drinking less alcohol in the future.  Please follow with your regular doctor in the next 1 week.  Come back with any other concerns or questions you have.    If I am feeling unsafe or I am in a crisis, I will:   Contact my established care providers   Call the National Suicide Prevention Lifeline: 762.962.1882   Go to the nearest emergency room   Call 911       Warning signs that I or other people might notice when a crisis is developing for me: Feeling paranoid, start drinking, not sleeping.    Things I am able to do on my own to cope or help me feel better: Taking medication, watching tv, listening to music.     Things that I am able to do with others to cope or help me better: Talk to my cousin, Adrian and friends.      Things I can use or do for distraction: Working, go to [employer].     Changes I can make to support my mental health and wellness: Take my medications and stop drinking.     People in my life that I can ask for help: Cousin Adrian and friends.     Your [facility] has a mental health crisis team you can call 24/7: [facility] Crisis Line Number: [phone]     Other things that are important when I m in crisis: Ask for help from my friends, come back to the hospital.     Additional resources and information: below     Crisis Lines  Crisis Text Line  Text 191440  You will be connected with a trained live crisis counselor to provide support.    Gambling Hotline  1.968.053.hope [4673]    National Hope Line  1.800.SUICIDE [2897075]    National Suicide Prevention Lifeline  Free and confidential support  1.494.971.TALK [2574]  http://suicidepreventionlifeline.org    The [employer] Project (LGBTQ Youth Crisis Line)  0.592.231.6792  text START to 906-545    's Crisis Line  9.026.813.4631 (Press 1)  or text 953996    Community Resources  Fast Tracker  Linking people to mental health and substance use disorder resources  CoinKeeper.org  "    [place] [facility] Warm Line  Peer to peer support  Monday thru Saturday, 12 pm to 10 pm  087.215.9218 or 7.918.138.0288  Text \"Support\" to 75758    National [place] on Mental Illness (OFELIA)  216.602.7954 or 1.888.OFELIA.HELPS    Walk-in Counseling Center  Free mental health counseling  [facility]  564.857.8162    Mental Health Apps  My3  https://SeekSherpa.org/    VirtualHopeBox  https://Clean TeQ/apps/virtual-hope-box/    Suicide Safety Plan ([employer])    Calm Harm  "

## 2021-11-18 NOTE — ED NOTES
Patient agreeable to discharge plan. Discharge instructions reviewed with patient including follow-up care plan. As for medications, the plan is for him to resume his Zyprexa prescription that he has supply of at home. Reviewed safety plan and outpatient resources. Denies SI and HI. All belongings that were brought into the hospital have been returned to patient. Escorted off the unit at 1230 accompanied by [facility] staff. Discharged to home via taxi services.

## 2021-11-18 NOTE — ED PROVIDER NOTES
"[facility] Unit - Initial Psychiatric Observation Note  [facility] Emergency Department  Observation Initiation Date: Nov 17, 2021    Ki Keene MRN: [id number]   Age: 35 year old YOB: 1985     History     Chief Complaint   Patient presents with     Altered Mental Status     HPI  Ki Keene is a 35 year old male with a past history notable for psychosis, paranoia, insomnia, who presents to the ED intoxicated and in a state of agitation while reporting auditory hallucinations and worsening paranoia.  Police report he was found acting erratically; walking into streets, stopping traffic, banging on cars, and yelling at bystanders.  He required physical restraints and IM Haldol 5 mg twice for behavior control.  He was medically cleared and transferred to [facility] for psychiatric assessment.  He is familiar to the unit most from the most recent stay 11/2/21-11/4/21 for increased paranoia and hallucinations.  He was restarted on Zyprexa 10 mg at bedtime which he previously had been prescribed. He was stabilized and able to discharge.  On exam, he states he was doing good once he left but now feels \"sick.\"  He reports hearing voices and states there are people chasing him. He states he feels scared. He confirms he has been drinking to the point of intoxication but is unable to recall how much alcohol and for how long of a duration he's been consuming.  His blood alcohol level in the ED was noted to be 0.24.  He states he lost his medications and has not been taking the prescribed Zyprexa.  There is question if he followed up with appointments from last stay.  He has not been able to hold down a job due to his paranoia and is struggling with finding stable housing.    Past Medical History  No past medical history on file.  No past surgical history on file.  No current outpatient medications on file.    Not on File  Family History  No family history on file.  Social History   Social History     Tobacco Use " "    Smoking status: Not on file     Smokeless tobacco: Not on file   Substance Use Topics     Alcohol use: Not on file     Drug use: Not on file      Past medical history, past surgical history, medications, allergies, family history, and social history were reviewed with the patient. No additional pertinent items.       Review of Systems  A complete review of systems was performed with pertinent positives and negatives noted in the HPI, and all other systems negative.    Physical Examination   BP: (!) 173/109  Pulse: (!) 135  Temp: 98  F (36.7  C)  Resp: 12  Height: 172.7 cm (5' 8\")  Weight: 80.8 kg (178 lb 1.6 oz)  SpO2: 96 %    Physical Exam  General: Appears stated age.   Neuro: Alert and fully oriented. Extremities appear to demonstrate normal strength on visual inspection.   Integumentary/Skin: no rash visualized, normal color    Psychiatric Examination   Appearance: fatigued  Attitude:  cooperative  Eye Contact:  good  Mood:  better  Affect:  intensity is blunted  Speech:  clear, coherent  Psychomotor Behavior:  no evidence of tardive dyskinesia, dystonia, or tics and tremor observed   Thought Process:  disorganized  Associations:  no loose associations  Thought Content:  no evidence of suicidal ideation or homicidal ideation, auditory hallucinations present and visual hallucinations present  Insight:  limited  Judgement:  limited  Oriented to:  time, person, and place  Attention Span and Concentration:  fair  Recent and Remote Memory:  limited  Language: able to name/identify objects without impairment  Fund of Knowledge: intact with awareness of current and past events    ED Course        Labs Ordered and Resulted from Time of ED Arrival to Time of ED Departure   COMPREHENSIVE METABOLIC PANEL - Abnormal       Result Value    Sodium 139      Potassium 3.4      Chloride 104      Carbon Dioxide (CO2) 21      Anion Gap 14      Urea Nitrogen 8      Creatinine 0.84      Calcium 9.1      Glucose 95      Alkaline " Phosphatase 102      AST 77 (*)     ALT 78 (*)     Protein Total 8.5      Albumin 4.3      Bilirubin Total 0.3      GFR Estimate >90     ETHYL ALCOHOL LEVEL - Abnormal    Alcohol ethyl 0.24 (*)    COVID-19 VIRUS (CORONAVIRUS) BY PCR - Normal    SARS CoV2 PCR Negative     GLUCOSE BY METER - Normal    GLUCOSE BY METER POCT 97     TROPONIN I - Normal    Troponin I <0.015     DRUG ABUSE SCREEN 77 URINE (FL, RH, SH) - Normal    Amphetamines Urine Screen Negative      Barbiturates Urine Screen Negative      Benzodiazepines Urine Screen Negative      Cannabinoids Urine Screen Negative      Cocaine Urine Screen Negative      Opiates Urine Screen Negative      PCP Urine Screen Negative     ISTAT TROPONIN POCT - Normal    TROPPC POCT 0.00     TROPONIN I - Normal    Troponin I <0.015     GLUCOSE MONITOR NURSING POCT   CBC WITH PLATELETS AND DIFFERENTIAL    WBC Count 4.7      RBC Count 5.09      Hemoglobin 14.4      Hematocrit 41.8      MCV 82      MCH 28.3      MCHC 34.4      RDW 13.6      Platelet Count 315      % Neutrophils 67      % Lymphocytes 27      % Monocytes 5      % Eosinophils 0      % Basophils 1      % Immature Granulocytes 0      NRBCs per 100 WBC 0      Absolute Neutrophils 3.2      Absolute Lymphocytes 1.3      Absolute Monocytes 0.2      Absolute Eosinophils 0.0      Absolute Basophils 0.0      Absolute Immature Granulocytes 0.0      Absolute NRBCs 0.0         Assessments & Plan (with Medical Decision Making)   Patient presenting with psychosis. Nursing notes reviewed noting no acute issues.     I have reviewed the assessment completed by the [facility].     During the observation period, the patient did not require medications for agitation, and did not require restraints/seclusion for patient and/or provider safety.     The patient was found to have a psychiatric condition that would benefit from an observation stay in the emergency department for further psychiatric stabilization and/or coordination of a safe  disposition. The observation plan includes serial assessments of psychiatric condition, potential administration of medications if indicated, further disposition pending the patient's psychiatric course during the monitoring period.     Preliminary diagnosis:    ICD-10-CM    1. Alcoholic intoxication with complication (H)  F10.929    2. Paranoia (H)  F22    3. Noncompliance with medication regimen  Z91.14    4. Schizophrenia spectrum disorder with psychotic disorder type not yet determined (H)  F29         Treatment Plan:  -Place on observation status for further crisis stabilization and symptom monitoring.  -CIWA for alcohol withdrawal.  -Risperdone 3 mg at bedtime. Will switch from Zyprexa as risperdone consta may be of benefit to patient due to medication non-adherence.  -May need to consider inpatient hospitalization if adequate supports can not be arranged as he may benefit from a supervised facility, such as an IRT.  -Reassess tomorrow.    --  POLO Colvin CNP   [facility] EMERGENCY DEPT  [facility] Unit  11/17/2021        Desi Horowitz APRN CNP  11/17/21 [id number]
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
